# Patient Record
Sex: MALE | Race: WHITE | NOT HISPANIC OR LATINO | Employment: FULL TIME | URBAN - METROPOLITAN AREA
[De-identification: names, ages, dates, MRNs, and addresses within clinical notes are randomized per-mention and may not be internally consistent; named-entity substitution may affect disease eponyms.]

---

## 2017-01-27 ENCOUNTER — OFFICE VISIT (OUTPATIENT)
Dept: MEDICAL GROUP | Facility: PHYSICIAN GROUP | Age: 47
End: 2017-01-27
Payer: COMMERCIAL

## 2017-01-27 VITALS
SYSTOLIC BLOOD PRESSURE: 128 MMHG | TEMPERATURE: 98.1 F | BODY MASS INDEX: 26.82 KG/M2 | OXYGEN SATURATION: 97 % | HEART RATE: 76 BPM | RESPIRATION RATE: 12 BRPM | WEIGHT: 198 LBS | HEIGHT: 72 IN | DIASTOLIC BLOOD PRESSURE: 80 MMHG

## 2017-01-27 DIAGNOSIS — R07.9 RECURRENT CHEST PAIN: ICD-10-CM

## 2017-01-27 DIAGNOSIS — E78.2 MIXED HYPERLIPIDEMIA: ICD-10-CM

## 2017-01-27 DIAGNOSIS — E03.4 HYPOTHYROIDISM DUE TO ACQUIRED ATROPHY OF THYROID: ICD-10-CM

## 2017-01-27 PROCEDURE — 99214 OFFICE O/P EST MOD 30 MIN: CPT | Performed by: NURSE PRACTITIONER

## 2017-01-27 RX ORDER — TIZANIDINE 4 MG/1
4 TABLET ORAL EVERY 6 HOURS PRN
Qty: 30 TAB | Refills: 3 | Status: SHIPPED | OUTPATIENT
Start: 2017-01-27 | End: 2018-08-24

## 2017-01-27 RX ORDER — METHYLPREDNISOLONE 4 MG/1
TABLET ORAL
Qty: 21 TAB | Refills: 0 | Status: SHIPPED | OUTPATIENT
Start: 2017-01-27 | End: 2018-08-24

## 2017-01-27 ASSESSMENT — PATIENT HEALTH QUESTIONNAIRE - PHQ9: CLINICAL INTERPRETATION OF PHQ2 SCORE: 0

## 2017-01-27 NOTE — ASSESSMENT & PLAN NOTE
Patient has history of hyperlipidemia but is not on any statin treatment. Last fasting lipid panel 2013

## 2017-01-27 NOTE — ASSESSMENT & PLAN NOTE
"Long-standing problem for about 5 years, worse over this past week. The pain is located in middle of chest.The pain never wakes him up at night. The pain is described as dull, \"just kind of there.\" Certain movements make it worse. There is no worsening of pain with exertion. There is no associated shortness of breath, dyspnea, diaphoresis, arm or jaw pain, or sense of doom. He has been more tired recently, but also has a toddler now and states he was feeling more tired since diagnosed with low thyroid.  No treatments tried. He has not been smoking regularly, but this week was smoking a little bit with his friends. Had EKG around 2011 that was normal. At this time he was not having chest pains.   Reports occasional indigestion with trigger foods, but not routinely. He is a  so is physically demanding, but no regular exercise. No associated neck or upper back pain. He did break right collar bone one year ago and since then has had resultant right chest and shoulder pains.   "

## 2017-01-27 NOTE — ASSESSMENT & PLAN NOTE
Established problem currently managed with levothyroxine 100 mcg daily. Patient reports taking this on an empty stomach. Denies any myalgias, poor concentration, weight gain, or change in skin. He reports chronic fatigue, but relates this to his toddler   Ref. Range 1/30/2015 16:04 6/10/2016 12:05   TSH Latest Ref Range: 0.300-3.700 uIU/mL 1.320 0.900   Free T-4 Latest Ref Range: 0.53-1.43 ng/dL 0.84 1.04

## 2017-01-30 NOTE — PROGRESS NOTES
"Subjective:     Chief Complaint   Patient presents with   • Chest Pain     this has been happening but is worsening   • Thyroid Problem     needs thyroid levels checked       HPI  Michele Griffin is a 46 y.o. Male, patient of Dr. Navas, here today for complaints of chest pain and to discuss thyroid    Hypothyroidism  Established problem currently managed with levothyroxine 100 mcg daily. Patient reports taking this on an empty stomach. Denies any myalgias, poor concentration, weight gain, or change in skin. He reports chronic fatigue, but relates this to his toddler   Ref. Range 1/30/2015 16:04 6/10/2016 12:05   TSH Latest Ref Range: 0.300-3.700 uIU/mL 1.320 0.900   Free T-4 Latest Ref Range: 0.53-1.43 ng/dL 0.84 1.04       Mixed Hyperlipidemia  Patient has history of hyperlipidemia but is not on any statin treatment. Last fasting lipid panel 2013    Recurrent chest pain  Long-standing problem for about 5 years, worse over this past week. The pain is located in middle of chest.The pain never wakes him up at night. The pain is described as dull, \"just kind of there.\" Certain movements make it worse. There is no worsening of pain with exertion. There is no associated shortness of breath, dyspnea, diaphoresis, arm or jaw pain, or sense of doom. He has been more tired recently, but also has a toddler now and states he was feeling more tired since diagnosed with low thyroid.  No treatments tried. He has not been smoking regularly, but this week was smoking a little bit with his friends. Had EKG around 2011 that was normal. At this time he was not having chest pains.   Reports occasional indigestion with trigger foods, but not routinely. He is a  so is physically demanding, but no regular exercise. No associated neck or upper back pain. He did break right collar bone one year ago and since then has had resultant right chest and shoulder pains.          Diagnoses of Recurrent chest pain, Hypothyroidism " due to acquired atrophy of thyroid, and Mixed hyperlipidemia were pertinent to this visit.    Allergies: Review of patient's allergies indicates no known allergies.  Current medicines (including changes today)  Current Outpatient Prescriptions   Medication Sig Dispense Refill   • tizanidine (ZANAFLEX) 4 MG Tab Take 1 Tab by mouth every 6 hours as needed. 30 Tab 3   • MethylPREDNISolone (MEDROL DOSEPAK) 4 MG Tablet Therapy Pack Follow package instructions 21 Tab 0   • levothyroxine (SYNTHROID) 100 MCG Tab TAKE 1 TABLET BY MOUTH MONDAY THROUGH SATURDAY SKIP SUNDAYS 100 Tab 3   • famciclovir (FAMVIR) 500 MG Tab Take 1 Tab by mouth 2 Times a Day. 14 Tab 3   • buPROPion SR (WELLBUTRIN-SR) 150 MG TABLET SR 12 HR sustained-release tablet TAKE 1 TABLET BY MOUTH TWICE DAILY 180 Tab 1     No current facility-administered medications for this visit.       He  has a past medical history of Inguinal hernia; Herpetic gingivostomatitis; Substance abuse; and Thyroid disease.    Health Maintenance: UTD    ROS  As stated in HPI      Objective:     Blood pressure 128/80, pulse 76, temperature 36.7 °C (98.1 °F), resp. rate 12, height 1.829 m (6'), weight 89.812 kg (198 lb), SpO2 97 %. Body mass index is 26.85 kg/(m^2).  Physical Exam:  General: Alert, oriented, in no acute distress.  Eye contact is good, speech goal directed, affect calm  HEENT: conjunctiva non-injected, sclera non-icteric, EOMs intact.  Neck: No adenopathy or masses in the cervical or supraclavicular regions. No thyromegaly  Lungs: unlabored. clear to auscultation bilaterally with good excursion.  CV: regular rate and rhythm. No murmurs or friction rub. No carotid bruits.   Abdomen: Soft, ND, non-tender. No hepatosplenomegaly.   MSK: No tenderness with palpation of ICS of ribs or sternum. Patient crossed arms over chest and squeezed stating this reproduced pain.       Assessment and Plan:   The following treatment plan was discussed  1. Recurrent chest pain   pain  does appear to be of musculoskeletal origin. I have recommended we do an EKG today as the previous EKG was done prior to him having these symptoms. Patient declines EKG today. He will agree to do EKG at the following blood workup and treatment regimen offers no relief. We will check CMP for electrolyte abnormalities.    check chest x-ray to ensure no bony abnormality causing symptoms such as possible broken rib. Start Medrol Dosepak and tizanidine as it appears that pain is muscular and maybe with decreasing inflammation and relaxing the muscles with light stretching resolve   COMP METABOLIC PANEL    DX-CHEST-2 VIEWS    tizanidine (ZANAFLEX) 4 MG Tab    MethylPREDNISolone (MEDROL DOSEPAK) 4 MG Tablet Therapy Pack   2. Hypothyroidism due to acquired atrophy of thyroid   stable. Check thyroid level now  TSH   3. Mixed hyperlipidemia   follow-up lipid panel due now to determine where patient is in regards to this management  LIPID PROFILE       Followup: Return in about 3 years (around 1/27/2020), or if no resolution of chest pains. sooner should new symptoms or problems arise.

## 2017-02-08 ENCOUNTER — HOSPITAL ENCOUNTER (OUTPATIENT)
Dept: LAB | Facility: MEDICAL CENTER | Age: 47
End: 2017-02-08
Attending: NURSE PRACTITIONER
Payer: COMMERCIAL

## 2017-02-08 ENCOUNTER — HOSPITAL ENCOUNTER (OUTPATIENT)
Dept: RADIOLOGY | Facility: MEDICAL CENTER | Age: 47
End: 2017-02-08
Attending: NURSE PRACTITIONER
Payer: COMMERCIAL

## 2017-02-08 DIAGNOSIS — R07.9 RECURRENT CHEST PAIN: ICD-10-CM

## 2017-02-08 DIAGNOSIS — E78.2 MIXED HYPERLIPIDEMIA: ICD-10-CM

## 2017-02-08 DIAGNOSIS — E03.4 HYPOTHYROIDISM DUE TO ACQUIRED ATROPHY OF THYROID: ICD-10-CM

## 2017-02-08 LAB
ALBUMIN SERPL BCP-MCNC: 4.2 G/DL (ref 3.2–4.9)
ALBUMIN/GLOB SERPL: 1.3 G/DL
ALP SERPL-CCNC: 68 U/L (ref 30–99)
ALT SERPL-CCNC: 15 U/L (ref 2–50)
ANION GAP SERPL CALC-SCNC: 4 MMOL/L (ref 0–11.9)
AST SERPL-CCNC: 14 U/L (ref 12–45)
BILIRUB SERPL-MCNC: 0.3 MG/DL (ref 0.1–1.5)
BUN SERPL-MCNC: 18 MG/DL (ref 8–22)
CALCIUM SERPL-MCNC: 9.3 MG/DL (ref 8.5–10.5)
CHLORIDE SERPL-SCNC: 104 MMOL/L (ref 96–112)
CHOLEST SERPL-MCNC: 231 MG/DL (ref 100–199)
CO2 SERPL-SCNC: 27 MMOL/L (ref 20–33)
CREAT SERPL-MCNC: 0.81 MG/DL (ref 0.5–1.4)
GLOBULIN SER CALC-MCNC: 3.2 G/DL (ref 1.9–3.5)
GLUCOSE SERPL-MCNC: 83 MG/DL (ref 65–99)
HDLC SERPL-MCNC: 42 MG/DL
LDLC SERPL CALC-MCNC: 137 MG/DL
POTASSIUM SERPL-SCNC: 4.2 MMOL/L (ref 3.6–5.5)
PROT SERPL-MCNC: 7.4 G/DL (ref 6–8.2)
SODIUM SERPL-SCNC: 135 MMOL/L (ref 135–145)
TRIGL SERPL-MCNC: 259 MG/DL (ref 0–149)
TSH SERPL DL<=0.005 MIU/L-ACNC: 1.3 UIU/ML (ref 0.3–3.7)

## 2017-02-08 PROCEDURE — 36415 COLL VENOUS BLD VENIPUNCTURE: CPT

## 2017-02-08 PROCEDURE — 84443 ASSAY THYROID STIM HORMONE: CPT

## 2017-02-08 PROCEDURE — 71020 DX-CHEST-2 VIEWS: CPT

## 2017-02-08 PROCEDURE — 80053 COMPREHEN METABOLIC PANEL: CPT

## 2017-02-08 PROCEDURE — 80061 LIPID PANEL: CPT

## 2017-10-16 ENCOUNTER — OFFICE VISIT (OUTPATIENT)
Dept: URGENT CARE | Facility: PHYSICIAN GROUP | Age: 47
End: 2017-10-16
Payer: COMMERCIAL

## 2017-10-16 ENCOUNTER — HOSPITAL ENCOUNTER (OUTPATIENT)
Dept: RADIOLOGY | Facility: MEDICAL CENTER | Age: 47
End: 2017-10-16
Attending: FAMILY MEDICINE
Payer: COMMERCIAL

## 2017-10-16 VITALS
DIASTOLIC BLOOD PRESSURE: 86 MMHG | OXYGEN SATURATION: 98 % | WEIGHT: 184 LBS | HEART RATE: 78 BPM | SYSTOLIC BLOOD PRESSURE: 140 MMHG | BODY MASS INDEX: 24.92 KG/M2 | RESPIRATION RATE: 14 BRPM | HEIGHT: 72 IN | TEMPERATURE: 98.2 F

## 2017-10-16 DIAGNOSIS — R07.89 CHEST PRESSURE: ICD-10-CM

## 2017-10-16 PROCEDURE — 71020 DX-CHEST-2 VIEWS: CPT

## 2017-10-16 PROCEDURE — 99214 OFFICE O/P EST MOD 30 MIN: CPT | Performed by: FAMILY MEDICINE

## 2017-10-16 ASSESSMENT — PAIN SCALES - GENERAL: PAINLEVEL: NO PAIN

## 2017-10-31 ASSESSMENT — ENCOUNTER SYMPTOMS
WEAKNESS: 0
VOMITING: 0
NAUSEA: 0
EXERTIONAL CHEST PRESSURE: 0

## 2017-10-31 NOTE — PROGRESS NOTES
Subjective:   Michele Baldwin a 47 y.o. male who presents for Chest Pressure (x1day, chest pressure, SOB)        Chest Pain    This is a new problem. The current episode started yesterday. The onset quality is gradual. The problem occurs constantly. The problem has been waxing and waning. The pain is moderate. The quality of the pain is described as dull. Pertinent negatives include no exertional chest pressure, nausea, vomiting or weakness.     Review of Systems   Cardiovascular: Positive for chest pain.   Gastrointestinal: Negative for nausea and vomiting.   Neurological: Negative for weakness.     No Known Allergies   Objective:   /86   Pulse 78   Temp 36.8 °C (98.2 °F)   Resp 14   Ht 1.829 m (6')   Wt 83.5 kg (184 lb)   SpO2 98%   BMI 24.95 kg/m²   Physical Exam   Constitutional: He is oriented to person, place, and time. He appears well-developed and well-nourished. No distress.   HENT:   Head: Normocephalic and atraumatic.   Eyes: Conjunctivae and EOM are normal. Pupils are equal, round, and reactive to light.   Cardiovascular: Normal rate and regular rhythm.    No murmur heard.  Pulmonary/Chest: Effort normal and breath sounds normal. No respiratory distress. He exhibits tenderness.   Abdominal: Soft. He exhibits no distension. There is no tenderness.   Neurological: He is alert and oriented to person, place, and time. He has normal reflexes. No sensory deficit.   Skin: Skin is warm and dry.   Psychiatric: He has a normal mood and affect.         Assessment/Plan:   Assessment    1. Chest pressure  No acute CP Process on my read. Use over-the-counter pain reliever, such as acetaminophen (Tylenol), ibuprofen (Advil, Motrin) or naproxen (Aleve) as needed; follow package directions for dosing. Differential diagnosis, natural history, supportive care, and indications for immediate follow-up discussed.   - DX-CHEST-2 VIEWS; Future

## 2017-11-06 ENCOUNTER — APPOINTMENT (OUTPATIENT)
Dept: RADIOLOGY | Facility: MEDICAL CENTER | Age: 47
DRG: 552 | End: 2017-11-06
Attending: EMERGENCY MEDICINE
Payer: COMMERCIAL

## 2017-11-06 ENCOUNTER — HOSPITAL ENCOUNTER (INPATIENT)
Facility: MEDICAL CENTER | Age: 47
LOS: 1 days | DRG: 552 | End: 2017-11-07
Attending: EMERGENCY MEDICINE | Admitting: NEUROLOGICAL SURGERY
Payer: COMMERCIAL

## 2017-11-06 DIAGNOSIS — S09.90XA CLOSED HEAD INJURY, INITIAL ENCOUNTER: ICD-10-CM

## 2017-11-06 DIAGNOSIS — S22.060A CLOSED WEDGE COMPRESSION FRACTURE OF SEVENTH THORACIC VERTEBRA, INITIAL ENCOUNTER: ICD-10-CM

## 2017-11-06 DIAGNOSIS — S92.901A CLOSED FRACTURE OF RIGHT FOOT, INITIAL ENCOUNTER: ICD-10-CM
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
## 2017-11-06 DIAGNOSIS — S16.1XXA STRAIN OF NECK MUSCLE, INITIAL ENCOUNTER: ICD-10-CM

## 2017-11-06 DIAGNOSIS — S20.211A CONTUSION OF RIGHT CHEST WALL, INITIAL ENCOUNTER: ICD-10-CM

## 2017-11-06 PROBLEM — S22.009A THORACIC SPINE FRACTURE (HCC): Status: ACTIVE | Noted: 2017-11-06

## 2017-11-06 LAB
ABO GROUP BLD: NORMAL
ALBUMIN SERPL BCP-MCNC: 4.3 G/DL (ref 3.2–4.9)
ALBUMIN/GLOB SERPL: 1.3 G/DL
ALP SERPL-CCNC: 66 U/L (ref 30–99)
ALT SERPL-CCNC: 18 U/L (ref 2–50)
ANION GAP SERPL CALC-SCNC: 8 MMOL/L (ref 0–11.9)
APTT PPP: 22.1 SEC (ref 24.7–36)
AST SERPL-CCNC: 23 U/L (ref 12–45)
BILIRUB SERPL-MCNC: 0.5 MG/DL (ref 0.1–1.5)
BLD GP AB SCN SERPL QL: NORMAL
BUN SERPL-MCNC: 19 MG/DL (ref 8–22)
CALCIUM SERPL-MCNC: 9.2 MG/DL (ref 8.5–10.5)
CHLORIDE SERPL-SCNC: 106 MMOL/L (ref 96–112)
CO2 SERPL-SCNC: 24 MMOL/L (ref 20–33)
CREAT SERPL-MCNC: 0.84 MG/DL (ref 0.5–1.4)
ERYTHROCYTE [DISTWIDTH] IN BLOOD BY AUTOMATED COUNT: 39.6 FL (ref 35.9–50)
ETHANOL BLD-MCNC: 0 G/DL
GFR SERPL CREATININE-BSD FRML MDRD: >60 ML/MIN/1.73 M 2
GLOBULIN SER CALC-MCNC: 3.2 G/DL (ref 1.9–3.5)
GLUCOSE SERPL-MCNC: 117 MG/DL (ref 65–99)
HCT VFR BLD AUTO: 49.4 % (ref 42–52)
HGB BLD-MCNC: 16.9 G/DL (ref 14–18)
INR PPP: 1.03 (ref 0.87–1.13)
MCH RBC QN AUTO: 31 PG (ref 27–33)
MCHC RBC AUTO-ENTMCNC: 34.2 G/DL (ref 33.7–35.3)
MCV RBC AUTO: 90.5 FL (ref 81.4–97.8)
PLATELET # BLD AUTO: 200 K/UL (ref 164–446)
PMV BLD AUTO: 9.3 FL (ref 9–12.9)
POTASSIUM SERPL-SCNC: 4.1 MMOL/L (ref 3.6–5.5)
PROT SERPL-MCNC: 7.5 G/DL (ref 6–8.2)
PROTHROMBIN TIME: 13.2 SEC (ref 12–14.6)
RBC # BLD AUTO: 5.46 M/UL (ref 4.7–6.1)
RH BLD: NORMAL
SODIUM SERPL-SCNC: 138 MMOL/L (ref 135–145)
WBC # BLD AUTO: 6.4 K/UL (ref 4.8–10.8)

## 2017-11-06 PROCEDURE — 72131 CT LUMBAR SPINE W/O DYE: CPT

## 2017-11-06 PROCEDURE — 700111 HCHG RX REV CODE 636 W/ 250 OVERRIDE (IP): Performed by: EMERGENCY MEDICINE

## 2017-11-06 PROCEDURE — L0150 CERV SEMI-RIG ADJ MOLDED CHN: HCPCS

## 2017-11-06 PROCEDURE — 96374 THER/PROPH/DIAG INJ IV PUSH: CPT

## 2017-11-06 PROCEDURE — 700102 HCHG RX REV CODE 250 W/ 637 OVERRIDE(OP): Performed by: NEUROLOGICAL SURGERY

## 2017-11-06 PROCEDURE — 700102 HCHG RX REV CODE 250 W/ 637 OVERRIDE(OP): Performed by: EMERGENCY MEDICINE

## 2017-11-06 PROCEDURE — 85027 COMPLETE CBC AUTOMATED: CPT

## 2017-11-06 PROCEDURE — 85610 PROTHROMBIN TIME: CPT

## 2017-11-06 PROCEDURE — 96376 TX/PRO/DX INJ SAME DRUG ADON: CPT

## 2017-11-06 PROCEDURE — 86900 BLOOD TYPING SEROLOGIC ABO: CPT

## 2017-11-06 PROCEDURE — 71010 DX-CHEST-LIMITED (1 VIEW): CPT

## 2017-11-06 PROCEDURE — A9270 NON-COVERED ITEM OR SERVICE: HCPCS | Performed by: NEUROLOGICAL SURGERY

## 2017-11-06 PROCEDURE — 71260 CT THORAX DX C+: CPT

## 2017-11-06 PROCEDURE — 73630 X-RAY EXAM OF FOOT: CPT | Mod: RT

## 2017-11-06 PROCEDURE — 85730 THROMBOPLASTIN TIME PARTIAL: CPT

## 2017-11-06 PROCEDURE — 72125 CT NECK SPINE W/O DYE: CPT

## 2017-11-06 PROCEDURE — L0458 TLSO 2MOD SYMPHIS-XIPHO PRE: HCPCS

## 2017-11-06 PROCEDURE — 770001 HCHG ROOM/CARE - MED/SURG/GYN PRIV*

## 2017-11-06 PROCEDURE — 99285 EMERGENCY DEPT VISIT HI MDM: CPT

## 2017-11-06 PROCEDURE — 96375 TX/PRO/DX INJ NEW DRUG ADDON: CPT

## 2017-11-06 PROCEDURE — 80307 DRUG TEST PRSMV CHEM ANLYZR: CPT

## 2017-11-06 PROCEDURE — A9270 NON-COVERED ITEM OR SERVICE: HCPCS | Performed by: EMERGENCY MEDICINE

## 2017-11-06 PROCEDURE — 36415 COLL VENOUS BLD VENIPUNCTURE: CPT

## 2017-11-06 PROCEDURE — 305948 HCHG GREEN TRAUMA ACT PRE-NOTIFY NO CC

## 2017-11-06 PROCEDURE — 70450 CT HEAD/BRAIN W/O DYE: CPT

## 2017-11-06 PROCEDURE — 72128 CT CHEST SPINE W/O DYE: CPT

## 2017-11-06 PROCEDURE — 80053 COMPREHEN METABOLIC PANEL: CPT

## 2017-11-06 PROCEDURE — 86850 RBC ANTIBODY SCREEN: CPT

## 2017-11-06 PROCEDURE — 700117 HCHG RX CONTRAST REV CODE 255: Performed by: EMERGENCY MEDICINE

## 2017-11-06 PROCEDURE — 86901 BLOOD TYPING SEROLOGIC RH(D): CPT

## 2017-11-06 RX ORDER — MORPHINE SULFATE 4 MG/ML
INJECTION, SOLUTION INTRAMUSCULAR; INTRAVENOUS
Status: COMPLETED | OUTPATIENT
Start: 2017-11-06 | End: 2017-11-06

## 2017-11-06 RX ORDER — MORPHINE SULFATE 4 MG/ML
4 INJECTION, SOLUTION INTRAMUSCULAR; INTRAVENOUS ONCE
Status: COMPLETED | OUTPATIENT
Start: 2017-11-06 | End: 2017-11-06

## 2017-11-06 RX ORDER — OXYCODONE HYDROCHLORIDE AND ACETAMINOPHEN 5; 325 MG/1; MG/1
1 TABLET ORAL ONCE
Status: COMPLETED | OUTPATIENT
Start: 2017-11-06 | End: 2017-11-06

## 2017-11-06 RX ORDER — METHOCARBAMOL 750 MG/1
750 TABLET, FILM COATED ORAL 4 TIMES DAILY
Status: DISCONTINUED | OUTPATIENT
Start: 2017-11-06 | End: 2017-11-06

## 2017-11-06 RX ORDER — HYDROCODONE BITARTRATE AND ACETAMINOPHEN 5; 325 MG/1; MG/1
1-2 TABLET ORAL EVERY 6 HOURS PRN
Status: DISCONTINUED | OUTPATIENT
Start: 2017-11-06 | End: 2017-11-06

## 2017-11-06 RX ORDER — METHOCARBAMOL 750 MG/1
750 TABLET, FILM COATED ORAL 4 TIMES DAILY
Status: DISCONTINUED | OUTPATIENT
Start: 2017-11-06 | End: 2017-11-07 | Stop reason: HOSPADM

## 2017-11-06 RX ORDER — KETOROLAC TROMETHAMINE 30 MG/ML
30 INJECTION, SOLUTION INTRAMUSCULAR; INTRAVENOUS EVERY 6 HOURS
Status: DISCONTINUED | OUTPATIENT
Start: 2017-11-06 | End: 2017-11-06

## 2017-11-06 RX ORDER — GABAPENTIN 300 MG/1
300 CAPSULE ORAL 3 TIMES DAILY
Status: DISCONTINUED | OUTPATIENT
Start: 2017-11-06 | End: 2017-11-06

## 2017-11-06 RX ORDER — GABAPENTIN 300 MG/1
300 CAPSULE ORAL 3 TIMES DAILY
Status: DISCONTINUED | OUTPATIENT
Start: 2017-11-06 | End: 2017-11-07 | Stop reason: HOSPADM

## 2017-11-06 RX ORDER — HYDROCODONE BITARTRATE AND ACETAMINOPHEN 5; 325 MG/1; MG/1
1-2 TABLET ORAL EVERY 6 HOURS PRN
Status: DISCONTINUED | OUTPATIENT
Start: 2017-11-06 | End: 2017-11-07 | Stop reason: HOSPADM

## 2017-11-06 RX ORDER — ONDANSETRON 2 MG/ML
4 INJECTION INTRAMUSCULAR; INTRAVENOUS ONCE
Status: COMPLETED | OUTPATIENT
Start: 2017-11-06 | End: 2017-11-06

## 2017-11-06 RX ORDER — KETOROLAC TROMETHAMINE 30 MG/ML
30 INJECTION, SOLUTION INTRAMUSCULAR; INTRAVENOUS EVERY 6 HOURS
Status: DISCONTINUED | OUTPATIENT
Start: 2017-11-07 | End: 2017-11-07 | Stop reason: HOSPADM

## 2017-11-06 RX ORDER — LEVOTHYROXINE SODIUM 0.1 MG/1
100 TABLET ORAL
COMMUNITY
End: 2018-08-24

## 2017-11-06 RX ADMIN — METHOCARBAMOL 750 MG: 750 TABLET ORAL at 20:46

## 2017-11-06 RX ADMIN — GABAPENTIN 300 MG: 300 CAPSULE ORAL at 20:46

## 2017-11-06 RX ADMIN — OXYCODONE HYDROCHLORIDE AND ACETAMINOPHEN 1 TABLET: 5; 325 TABLET ORAL at 16:00

## 2017-11-06 RX ADMIN — HYDROCODONE BITARTRATE AND ACETAMINOPHEN 2 TABLET: 5; 325 TABLET ORAL at 20:48

## 2017-11-06 RX ADMIN — MORPHINE SULFATE 2 MG: 4 INJECTION INTRAVENOUS at 11:46

## 2017-11-06 RX ADMIN — MORPHINE SULFATE 4 MG: 4 INJECTION INTRAVENOUS at 13:13

## 2017-11-06 RX ADMIN — ONDANSETRON 4 MG: 2 INJECTION INTRAMUSCULAR; INTRAVENOUS at 16:00

## 2017-11-06 RX ADMIN — IOHEXOL 100 ML: 350 INJECTION, SOLUTION INTRAVENOUS at 12:05

## 2017-11-06 ASSESSMENT — LIFESTYLE VARIABLES
AVERAGE NUMBER OF DAYS PER WEEK YOU HAVE A DRINK CONTAINING ALCOHOL: 7
DO YOU DRINK ALCOHOL: YES
CONSUMPTION TOTAL: POSITIVE
TOTAL SCORE: 0
TOTAL SCORE: 0
EVER FELT BAD OR GUILTY ABOUT YOUR DRINKING: NO
HAVE YOU EVER FELT YOU SHOULD CUT DOWN ON YOUR DRINKING: NO
HOW MANY TIMES IN THE PAST YEAR HAVE YOU HAD 5 OR MORE DRINKS IN A DAY: 1
EVER HAD A DRINK FIRST THING IN THE MORNING TO STEADY YOUR NERVES TO GET RID OF A HANGOVER: NO
HAVE PEOPLE ANNOYED YOU BY CRITICIZING YOUR DRINKING: NO
ON A TYPICAL DAY WHEN YOU DRINK ALCOHOL HOW MANY DRINKS DO YOU HAVE: 1
TOTAL SCORE: 0

## 2017-11-06 ASSESSMENT — PAIN SCALES - GENERAL
PAINLEVEL_OUTOF10: 3
PAINLEVEL_OUTOF10: 5
PAINLEVEL_OUTOF10: 5
PAINLEVEL_OUTOF10: 8
PAINLEVEL_OUTOF10: 6
PAINLEVEL_OUTOF10: 4
PAINLEVEL_OUTOF10: 6

## 2017-11-06 ASSESSMENT — ENCOUNTER SYMPTOMS
BACK PAIN: 1
NECK PAIN: 0

## 2017-11-06 NOTE — LETTER
FORM C-4:  EMPLOYEE’S CLAIM FOR COMPENSATION/ REPORT OF INITIAL TREATMENT  EMPLOYEE’S CLAIM - PROVIDE ALL INFORMATION REQUESTED   First Name  Dandy Last Name  One Birthdate             Age  1970 47 y.o. Sex  male Claim Number   Home Employee Address  [address] [address]  [facility]                                     Zip  [address] Height  1.829 m (6') Weight  83 kg (183 lb) [facility]  [id number]   Mailing Employee Address                           [address] [address]   [facility]               Zip  [address] Telephone  [phone] (home)  Primary Language Spoken  ENGLISH   Insurer  *** Third Party   [employer] [place] Employee's Occupation (Job Title) When Injury or Occupational Disease Occurred     Employer's Name   Telephone  [phone]    Employer Address  [address] [address] [facility] [address]] Zip  [address]   Date of Injury  11/6/2017       Hour of Injury  10:45 AM Date Employer Notified  11/6/2017 Last Day of Work after Injury or Occupational Disease  11/6/2017 Supervisor to Whom Injury Reported  Self - Michele Griffin   Address or Location of Accident (if applicable)  [[address] [place] [address]]   What were you doing at the time of accident? (if applicable)  working from ladder    How did this injury or occupational disease occur? Be specific and answer in detail. Use additional sheet if necessary)  Ladder slid as went to step from ladder to roof. fell - 10 feet onto right side and head into dirt. unconcious for 20 seconds   If you believe that you have an occupational disease, when did you first have knowledge of the disability and it relationship to your employment?  N/A Witnesses to the Accident  Bhaskar Cowan     Nature of Injury or Occupational Disease  Workers' Compensation  Part(s) of Body Injured or Affected  Lower Back Area (Lumbar Area & Lumbo-Sacral), Soft Tissue - Neck, Toe (R)    I certify that the above is true and correct to the best of my knowledge and that I  have provided this information in order to obtain the benefits of [place]  I hereby authorize any physician, chiropractor, surgeon, practitioner, or other person, any hospital, including [facility] or [facility], any medical service organization, any insurance company, or other institution or organization to release to each other, any medical or other information, including benefits paid or payable, pertinent to this injury or disease, except information relative to diagnosis, treatment and/or counseling for AIDS, psychological conditions, alcohol or controlled substances, for which I must give specific authorization.  A Photostat of this authorization shall be as valid as the original.   Date Place   Employee’s Signature   THIS REPORT MUST BE COMPLETED AND MAILED WITHIN 3 WORKING DAYS OF TREATMENT   Place  [facility], EMERGENCY DEPT  Name of Facility   [facility]   Date  10/26/2017 Diagnosis  No diagnosis found. Is there evidence the injured employee was under the influence of alcohol and/or another controlled substance at the time of accident?   Hour  1:39 PM Description of Injury or Disease       Treatment     Have you advised the patient to remain off work five days or more?             X-Ray Findings      If Yes   From Date    To Date      From information given by the employee, together with medical evidence, can you directly connect this injury or occupational disease as job incurred?    If No, is the employee capable of: Full Duty    Modified Duty      Is additional medical care by a physician indicated?    If Modified Duty, Specify any Limitations / Restrictions        Do you know of any previous injury or disease contributing to this condition or occupational disease?      Date  11/6/2017 Print Doctor’s Name  David Kenyon certify the  "employer’s copy of this form was mailed on:   Address  [address]  [place] [address] [phone] Insurer’s Use Only   [facility]  [address]    Provider’s Tax ID Number  [id number] Telephone  Dept: [phone]    Doctor’s Signature    Degree       Original - TREATING PHYSICIAN OR CHIROPRACTOR   Pg 2-Insurer/TPA   Pg 3-Employer   Pg 4-Employee                                                                                                  Form C-4 (rev01/03)     BRIEF DESCRIPTION OF RIGHTS AND BENEFITS  (Pursuant to [place] 616[place])    Notice of Injury or Occupational Disease (Incident Report Form C-1): If an injury or occupational disease (OD) arises out of and in the course of employment, you must provide written notice to your employer as soon as practicable, but no later than 7 days after the accident or OD. Your employer shall maintain a sufficient supply of the required forms.    Claim for Compensation (Form C-4): If medical treatment is sought, the form C-4 is available at the place of initial treatment. A completed \"Claim for Compensation\" (Form C-4) must be filed within 90 days after an accident or OD. The treating physician or chiropractor must, within 3 working days after treatment, complete and mail to the employer, the employer's insurer and third-party , the Claim for Compensation.    Medical Treatment: If you require medical treatment for your on-the-job injury or OD, you may be required to select a physician or chiropractor from a list provided by your workers’ compensation insurer, if it has contracted with an Organization for Managed Care (MCO) or Preferred Provider Organization (PPO) or providers of health care. If your employer has not entered into a contract with an MCO or PPO, you may select a physician or chiropractor from the Panel of Physicians and Chiropractors. Any medical costs related to your industrial injury or OD will be paid by your " insurer.    Temporary Total Disability (TTD): If your doctor has certified that you are unable to work for a period of at least 5 consecutive days, or 5 cumulative days in a 20-day period, or places restrictions on you that your employer does not accommodate, you may be entitled to TTD compensation.    Temporary Partial Disability (TPD): If the wage you receive upon reemployment is less than the compensation for TTD to which you are entitled, the insurer may be required to pay you TPD compensation to make up the difference. TPD can only be paid for a maximum of 24 months.    Permanent Partial Disability (PPD): When your medical condition is stable and there is an indication of a PPD as a result of your injury or OD, within 30 days, your insurer must arrange for an evaluation by a rating physician or chiropractor to determine the degree of your PPD. The amount of your PPD award depends on the date of injury, the results of the PPD evaluation and your age and wage.    Permanent Total Disability (PTD): If you are medically certified by a treating physician or chiropractor as permanently and totally disabled and have been granted a PTD status by your insurer, you are entitled to receive monthly benefits not to exceed 66 2/3% of your average monthly wage. The amount of your PTD payments is subject to reduction if you previously received a PPD award.    Vocational Rehabilitation Services: You may be eligible for vocational rehabilitation services if you are unable to return to the job due to a permanent physical impairment or permanent restrictions as a result of your injury or occupational disease.    Transportation and Per Alin Reimbursement: You may be eligible for travel expenses and per alin associated with medical treatment.  Reopening: You may be able to reopen your claim if your condition worsens after claim closure.    Appeal Process: If you disagree with a written determination issued by the insurer or the  insurer does not respond to your request, you may appeal to the Department of Administration, , by following the instructions contained in your determination letter. You must appeal the determination within 70 days from the date of the determination letter at [address], [address], [place], [place] [address], or [address], [address], [place], [place] [address]. If you disagree with the  decision, you may appeal to the Department of Administration, . You must file your appeal within 30 days from the date of the  decision letter at [address], [address], [place], [place] [address], or [address], [address], [place], [place] [address]. If you disagree with a decision of an , you may file a petition for judicial review with the District Court. You must do so within 30 days of the Appeal Officer’s decision. You may be represented by an  at your own expense or you may contact the [facility] for possible representation.    [place]  for Injured Workers (NAIW): If you disagree with a  decision, you may request that NAIW represent you without charge at an  Hearing. For information regarding denial of benefits, you may contact the [facility] at: [address], [address], [place] [address], [phone], or [address], [place] [address], [phone]    To File a Complaint with the Division: If you wish to file a complaint with the  of the Division of Industrial Relations (DIR), please contact the Workers’ Compensation Section, [address], [address], [place], [place] [address], telephone [phone]) [phone], or [address], [place] [address], telephone [phone].    For assistance with Workers’ Compensation Issues: you may contact the Office of the [facility] Consumer Health Assistance, [address], [address], [place], [place] [address], Toll Free 1-518.195.1372, Web site: [email or web address]., E-mail [email or web address]                                                                                                                                                                               __________________________________________________________________                                    _________________            Employee Name / Signature                                                                                                                            Date                                       D-2 (rev. 10/07)

## 2017-11-06 NOTE — LETTER
FORM C-4:  EMPLOYEE’S CLAIM FOR COMPENSATION/ REPORT OF INITIAL TREATMENT  EMPLOYEE’S CLAIM - PROVIDE ALL INFORMATION REQUESTED   First Name  MICHELE Last Name  DYAN Birthdate             Age  1970 47 y.o. Sex  male Claim Number   Home Employee Address  [address] [address]  [facility]                                     Zip  [address] Height  1.829 m (6') Weight  83 kg (183 lb) [facility]     Mailing Employee Address                           [address] [address]   [facility]               Zip  [address] Telephone  [phone] (home)  Primary Language Spoken  ENGLISH   Insurer  Unable to obtain Third Party   [employer]  [place] Employee's Occupation (Job Title) When Injury or Occupational Disease Occurred     Employer's Name  [employer] Telephone  [phone]    Employer Address  [address] [address] [place] [address]] Zip  [address]   Date of Injury  11/6/2017       Hour of Injury  10:45 AM Date Employer Notified  11/6/2017 Last Day of Work after Injury or Occupational Disease  11/6/2017 Supervisor to Whom Injury Reported  Self - Michele Griffin   Address or Location of Accident (if applicable)  [[address] [facility] [place] [address]]   What were you doing at the time of accident? (if applicable)  working from ladder    How did this injury or occupational disease occur? Be specific and answer in detail. Use additional sheet if necessary)  Ladder slid as went to step from ladder to roof. fell - 10 feet onto right side and head into dirt. unconcious for 20 seconds   If you believe that you have an occupational disease, when did you first have knowledge of the disability and it relationship to your employment?  N/A Witnesses to the Accident  Bhaskar Cowan     Nature of Injury or Occupational Disease  Workers' Compensation  Part(s) of Body Injured or Affected  Lower Back Area (Lumbar Area & Lumbo-Sacral), Soft Tissue - Neck, Toe (R)    I certify that the above is true and  correct to the best of my knowledge and that I have provided this information in order to obtain the benefits of [place]  I hereby authorize any physician, chiropractor, surgeon, practitioner, or other person, any hospital, including [facility] or [facility], any medical service organization, any insurance company, or other institution or organization to release to each other, any medical or other information, including benefits paid or payable, pertinent to this injury or disease, except information relative to diagnosis, treatment and/or counseling for AIDS, psychological conditions, alcohol or controlled substances, for which I must give specific authorization.  A Photostat of this authorization shall be as valid as the original.   Date Place  [facility] Employee’s Signature   THIS REPORT MUST BE COMPLETED AND MAILED WITHIN 3 WORKING DAYS OF TREATMENT   Place  [place] [facility] [place]  Name of Facility   [facility]   Date  10/26/2017 Diagnosis  (S16.1XXA) Strain of neck muscle, initial encounter  (S22.060A) Closed wedge compression fracture of seventh thoracic vertebra, initial encounter (CMS-HCC)  (W19.XXXA) Fall, initial encounter  (S92.901A) Closed fracture of right foot, initial encounter  (S20.211A) Contusion of right chest wall, initial encounter  (S09.90XA) Closed head injury, initial encounter Is there evidence the injured employee was under the influence of alcohol and/or another controlled substance at the time of accident?   Hour  5:36 PM Description of Injury or Disease  Strain of neck muscle, initial encounter  Closed wedge compression fracture of seventh thoracic vertebra, initial encounter (CMS-HCC)  Fall, initial encounter  Closed fracture of right foot, initial encounter  Contusion of right chest wall, initial encounter  Closed head injury, initial  "encounter No   Treatment  CT of the head, neck, chest, abdomen and pelvis to rule out injury and evaluate for injuries.  Have you advised the patient to remain off work five days or more?         Yes   X-Ray Findings  Positive   If Yes   From Date    To Date      From information given by the employee, together with medical evidence, can you directly connect this injury or occupational disease as job incurred?  Yes If No, is the employee capable of: Full Duty  No Modified Duty  No   Is additional medical care by a physician indicated?  Yes If Modified Duty, Specify any Limitations / Restrictions  No work till cleared by occupational health     Do you know of any previous injury or disease contributing to this condition or occupational disease?  No   Date  11/7/2017 Print Doctor’s Name  David Kenyon certify the employer’s copy of this form was mailed on:   Address  [address] [place] [id number] [phone] Insurer’s Use Only   [facility]  [address]    Provider’s Tax ID Number  [id number] Telephone  Dept: [phone]    Doctor’s Signature  e-DAVID Turner M.D. Degree   M.D.    Original - TREATING PHYSICIAN OR CHIROPRACTOR   Pg 2-Insurer/TPA   Pg 3-Employer   Pg 4-Employee                                                                                                  Form C-4 (rev01/03)     BRIEF DESCRIPTION OF RIGHTS AND BENEFITS  (Pursuant to [place] 616C.[place])    Notice of Injury or Occupational Disease (Incident Report Form C-1): If an injury or occupational disease (OD) arises out of and in the course of employment, you must provide written notice to your employer as soon as practicable, but no later than 7 days after the accident or OD. Your employer shall maintain a sufficient supply of the required forms.    Claim for Compensation (Form C-4): If medical treatment is sought, the form C-4 is available at the place of initial treatment. A completed \"Claim for Compensation\" " (Form C-4) must be filed within 90 days after an accident or OD. The treating physician or chiropractor must, within 3 working days after treatment, complete and mail to the employer, the employer's insurer and third-party , the Claim for Compensation.    Medical Treatment: If you require medical treatment for your on-the-job injury or OD, you may be required to select a physician or chiropractor from a list provided by your workers’ compensation insurer, if it has contracted with an Organization for Managed Care (MCO) or Preferred Provider Organization (PPO) or providers of health care. If your employer has not entered into a contract with an MCO or PPO, you may select a physician or chiropractor from the Panel of Physicians and Chiropractors. Any medical costs related to your industrial injury or OD will be paid by your insurer.    Temporary Total Disability (TTD): If your doctor has certified that you are unable to work for a period of at least 5 consecutive days, or 5 cumulative days in a 20-day period, or places restrictions on you that your employer does not accommodate, you may be entitled to TTD compensation.    Temporary Partial Disability (TPD): If the wage you receive upon reemployment is less than the compensation for TTD to which you are entitled, the insurer may be required to pay you TPD compensation to make up the difference. TPD can only be paid for a maximum of 24 months.    Permanent Partial Disability (PPD): When your medical condition is stable and there is an indication of a PPD as a result of your injury or OD, within 30 days, your insurer must arrange for an evaluation by a rating physician or chiropractor to determine the degree of your PPD. The amount of your PPD award depends on the date of injury, the results of the PPD evaluation and your age and wage.    Permanent Total Disability (PTD): If you are medically certified by a treating physician or chiropractor as permanently  and totally disabled and have been granted a PTD status by your insurer, you are entitled to receive monthly benefits not to exceed 66 2/3% of your average monthly wage. The amount of your PTD payments is subject to reduction if you previously received a PPD award.    Vocational Rehabilitation Services: You may be eligible for vocational rehabilitation services if you are unable to return to the job due to a permanent physical impairment or permanent restrictions as a result of your injury or occupational disease.    Transportation and Per Alin Reimbursement: You may be eligible for travel expenses and per alin associated with medical treatment.  Reopening: You may be able to reopen your claim if your condition worsens after claim closure.    Appeal Process: If you disagree with a written determination issued by the insurer or the insurer does not respond to your request, you may appeal to the Department of Administration, , by following the instructions contained in your determination letter. You must appeal the determination within 70 days from the date of the determination letter at [address], [address], [place] [address], or [address], [address], [place] [address]. If you disagree with the  decision, you may appeal to the Department of Administration, . You must file your appeal within 30 days from the date of the  decision letter at [address], [address], [place], [place] [address], or [address], [address], [place], [place] [address]. If you disagree with a decision of an , you may file a petition for judicial review with the District Court. You must do so within 30 days of the Appeal Officer’s decision. You may be represented by an  at your own expense or you may contact the [facility] for possible representation.    [place]  for Injured Workers (IW): If you disagree  with a  decision, you may request that [facility] represent you without charge at an  Hearing. For information regarding denial of benefits, you may contact the [facility] at: [address], [address], [place] [address], [phone], or [address], [address], [place] [address], [phone]    To File a Complaint with the Division: If you wish to file a complaint with the  of the Division of Industrial Relations (DIR), please contact the Workers’ Compensation Section, [address], [address], [place], [place] [address], telephone [phone]) [phone], or [address], [address], [place], [place] [address], telephone [phone].    For assistance with Workers’ Compensation Issues: you may contact the Office of the Governor Consumer Health Assistance, [address] [place] [address], [address], [place], [place] [address], Toll Free 1-399.699.4468, Web site: http://govcha.[facility].nv., E-mail [email or web address].[email or web address].                                                                                                                                                                               __________________________________________________________________                                    _________________            Employee Name / Signature                                                                                                                            Date                                       D-2 (rev. 10/07)

## 2017-11-06 NOTE — PROGRESS NOTES
Deroyal off the shelf TLSO back support brace has been applied and fitted to pt size XL has been used.

## 2017-11-06 NOTE — ED PROVIDER NOTES
ED Provider Note    Scribed for No att. providers found by Ciara Benavides. 11/6/2017, 11:41 AM.    Primary care provider: None.  Means of arrival: EMS  History obtained from: Patient and EMS  History limited by: None    CHIEF COMPLAINT  Chief Complaint   Patient presents with   • Fall Greater than 10 feet     loss of balance fell on ground     HPI Dandy One is a 47 y.o. male who presents to the Emergency Department as a trauma green. EMS reports patient was working on a roof, lost balance, and fell approximately 12 feet landing on his right side. EMS states loss of consciousness and obvious deformity of left ribs. EMS treated patient with 100 mcg of Fentanyl en route to hospital.   Patient reports mid back pain and right big toe pain. Patient states he landed on his tools during the fall. Denies neck pain, abdominal pain, numbness or tingling. The patient has no history of medical problems or allergies to medications. No numbness or tingling or weakness to his arms, legs, even transiently.  Denies any other acute concerns or complaints.    REVIEW OF SYSTEMS  Review of Systems   Musculoskeletal: Positive for back pain and joint pain (right ribs and right big toe). Negative for neck pain.   All other systems reviewed and are negative.    PAST MEDICAL HISTORY   patient denies pertinent medical history    SURGICAL HISTORY  patient denies any surgical history    SOCIAL HISTORY  Patient does not report drug, alcohol, or cigarette use.     FAMILY HISTORY  Patient denies pertinent family history    CURRENT MEDICATIONS  No current facility-administered medications on file prior to encounter.      No current outpatient prescriptions on file prior to encounter.     ALLERGIES  No Known Allergies    PHYSICAL EXAM  VITAL SIGNS: /106   Pulse 65   Temp 36.8 °C (98.2 °F)   Resp 18   Ht 1.829 m (6')   Wt 83 kg (183 lb)   BMI 24.82 kg/m²   Vitals reviewed.  Constitutional: Regular on spine board in full spinal  precautions.  HENT: Normocephalic, Atraumatic, Bilateral external ears normal, Oropharynx moist, No oral exudates, Nose normal.    Eyes: PERRL, EOMI, Conjunctiva normal, No discharge.   Neck: Midline tenderness, not initially ranged in a collar.  Cardiovascular: Normal heart rate, Normal rhythm, No murmurs, No rubs, No gallops.   Thorax & Lungs: Normal breath sounds, No respiratory distress, No wheezing, center chest wall abrasion and tenderness.   Abdomen: Bowel sounds normal, Soft, right upper quadrant tenderness .  Patient the right upper quadrant.  Skin: Warm, Dry, No erythema, No rash.   Back: Midthoracic tenderness.  No lumbar tenderness or step-offs.    Musculoskeletal: Good range of motion in all major joints. No edema. No tenderness to palpation or major deformities noted.  The right great toe is very tender, but is normal sensation.  No significant swelling.  No other foot tenderness.  Good pulses.  Good range of motion of hips, knees and ankles.  Neurologic: Alert, Normal motor function, Normal sensory function, No focal deficits noted.  Strength and sensation.  Psychiatric: Affect normal    LABS  Results for orders placed or performed during the hospital encounter of 11/06/17   DIAGNOSTIC ALCOHOL   Result Value Ref Range    Diagnostic Alcohol 0.00 0.00 g/dL   CBC WITHOUT DIFFERENTIAL   Result Value Ref Range    WBC 6.4 4.8 - 10.8 K/uL    RBC 5.46 4.70 - 6.10 M/uL    Hemoglobin 16.9 14.0 - 18.0 g/dL    Hematocrit 49.4 42.0 - 52.0 %    MCV 90.5 81.4 - 97.8 fL    MCH 31.0 27.0 - 33.0 pg    MCHC 34.2 33.7 - 35.3 g/dL    RDW 39.6 35.9 - 50.0 fL    Platelet Count 200 164 - 446 K/uL    MPV 9.3 9.0 - 12.9 fL   COMP METABOLIC PANEL   Result Value Ref Range    Sodium 138 135 - 145 mmol/L    Potassium 4.1 3.6 - 5.5 mmol/L    Chloride 106 96 - 112 mmol/L    Co2 24 20 - 33 mmol/L    Anion Gap 8.0 0.0 - 11.9    Glucose 117 (H) 65 - 99 mg/dL    Bun 19 8 - 22 mg/dL    Creatinine 0.84 0.50 - 1.40 mg/dL    Calcium 9.2 8.5  - 10.5 mg/dL    AST(SGOT) 23 12 - 45 U/L    ALT(SGPT) 18 2 - 50 U/L    Alkaline Phosphatase 66 30 - 99 U/L    Total Bilirubin 0.5 0.1 - 1.5 mg/dL    Albumin 4.3 3.2 - 4.9 g/dL    Total Protein 7.5 6.0 - 8.2 g/dL    Globulin 3.2 1.9 - 3.5 g/dL    A-G Ratio 1.3 g/dL   PROTHROMBIN TIME   Result Value Ref Range    PT 13.2 12.0 - 14.6 sec    INR 1.03 0.87 - 1.13   APTT   Result Value Ref Range    APTT 22.1 (L) 24.7 - 36.0 sec   COD (ADULT)   Result Value Ref Range    ABO Grouping Only O     Rh Grouping Only NEG     Antibody Screen-Cod NEG    ESTIMATED GFR   Result Value Ref Range    GFR If African American >60 >60 mL/min/1.73 m 2    GFR If Non African American >60 >60 mL/min/1.73 m 2     All labs reviewed by me.    RADIOLOGY  CT-HEAD W/O   Final Result      No acute intracranial abnormality.         CT-TSPINE W/O PLUS RECONS   Final Result   Addendum 1 of 1   These findings were discussed with AGA GARZA on 11/6/2017 12:27 PM.      Final      1.  Fracture of the anterior superior endplate of T7.  Posterior elements are intact.   2.  Congenital mild anterior wedging of multiple thoracic vertebral bodies.   3.  No subluxation.      CT-LSPINE W/O PLUS RECONS   Final Result      1.  Negative for lumbar spine fracture      2.  Multilevel degenerative changes including subluxations at L2-3 and L3-4      CT-CHEST,ABDOMEN,PELVIS WITH   Final Result      1.  No acute abnormality identified within the chest, abdomen, pelvis      2.  Right clavicle, right first rib, and multiple right inferior rib fracture which appear old      3.  Fatty infiltration of liver      4.  Simple hepatic cyst         CT-CSPINE WITHOUT PLUS RECONS   Final Result      1.  No cervical spine fracture or subluxation.   2.  Multilevel degenerative changes.      DX-FOOT-COMPLETE 3+ RIGHT   Final Result      1.  Fractures of the first proximal and distal phalanges      2.  Midfoot and forefoot osteoarthritis      DX-CHEST-LIMITED (1 VIEW)   Final Result       1.  No acute cardiopulmonary abnormality identified.      2.  Probable right clavicle fracture        The radiologist's interpretation of all radiological studies have been reviewed by me.    COURSE & MEDICAL DECISION MAKING  Pertinent Labs & Imaging studies reviewed. (See chart for details)    11:41 AM Patient seen and examined at bedside. The patient presents as a trauma green. He fell from 12 feet, hit his head and got knocked out.  He has neck and back pain.  He also has right-sided chest wall, abdominal pain.  It is indicated.  Rule out Ordered for DX-Chest, CT-Head, CT-L spine, CT-Chest, CT- C spine, CT- T spine, DX-foot complete 3+ right, ABO confirmation, COD, Estimated GFR, Component Cellular, APTT, PTT< CMP, CBC without differential, diagnostic alcohol to evaluate. Patient will be treated with morphine 4 mg/mL for his symptoms.      1:10 PM Patient was reevaluated. Patient reports he is still in pain at this time. I reviewed imaging results which indicated fracture of T7 and fracture of the right first proximal and distal phalanges. CT-Head is still pending. Patient will be treated with repeat morphine 4 mg and will be reevaluated.  She is reassessed, cervical collar was removed.  He had previously been log rolled off the board.  Good range of motion of his neck.  He is given some additional pain medications.    1:51 PM I discussed the patient's case and the above findings with Dr. Ryan (neurosurgeron) who agrees to follow up with patient after discharge.     2:40 PM Patient is resting comfortably. He reports feeling improved at this time. I explained we will place a TLSO brace and evaluate if he can tolerate ambulation.       The patient having a difficult time sitting up.  He is dizzy and lightheaded and feels nauseated.  Patient is a significant pain in his back.  He remains neurologically intact.  The patient will require admission for pain control.    I spoke with orthopedic doctor on call.  The  fellow thought that it dictating in a postop shoe was appropriate.  I communicated this to the nursing staff.  I spoke with Dr. Burton on the trauma surgery service regarding admission for pain control.  Assessment of the neurosurgeon, Dr. bernard.  I then called Dr. Ryan, who agreed to come in with the patient.  The patient is resting comfortably to some pain meds.  He'll be admitted for pain control, possibly PT consultation.    FINAL IMPRESSION  1. Strain of neck muscle, initial encounter    2. Closed wedge compression fracture of seventh thoracic vertebra, initial encounter (CMS-HCC)    3. Fall, initial encounter    4. Closed fracture of right foot, initial encounter     5.  Intractable back pain secondary to fracture     I, Ciara Benavides (Scribe), am scribing for, and in the presence of, No att. providers found.    Electronically signed by: Ciara Benavides (Scribe), 11/6/2017    I, No att. providers found personally performed the services described in this documentation, as scribed by Ciara Benavides in my presence, and it is both accurate and complete.    The note accurately reflects work and decisions made by me.  David Kenyon  11/6/2017  4:53 PM

## 2017-11-07 ENCOUNTER — HOME HEALTH ADMISSION (OUTPATIENT)
Dept: HOME HEALTH SERVICES | Facility: HOME HEALTHCARE | Age: 47
End: 2017-11-07

## 2017-11-07 VITALS
HEIGHT: 72 IN | BODY MASS INDEX: 24.79 KG/M2 | RESPIRATION RATE: 16 BRPM | TEMPERATURE: 98.9 F | HEART RATE: 68 BPM | DIASTOLIC BLOOD PRESSURE: 84 MMHG | SYSTOLIC BLOOD PRESSURE: 128 MMHG | WEIGHT: 183 LBS | OXYGEN SATURATION: 91 %

## 2017-11-07 PROCEDURE — 700102 HCHG RX REV CODE 250 W/ 637 OVERRIDE(OP): Performed by: NEUROLOGICAL SURGERY

## 2017-11-07 PROCEDURE — G8987 SELF CARE CURRENT STATUS: HCPCS | Mod: CI

## 2017-11-07 PROCEDURE — 97161 PT EVAL LOW COMPLEX 20 MIN: CPT

## 2017-11-07 PROCEDURE — G8978 MOBILITY CURRENT STATUS: HCPCS | Mod: CI

## 2017-11-07 PROCEDURE — G8988 SELF CARE GOAL STATUS: HCPCS | Mod: CI

## 2017-11-07 PROCEDURE — G8989 SELF CARE D/C STATUS: HCPCS | Mod: CI

## 2017-11-07 PROCEDURE — 97165 OT EVAL LOW COMPLEX 30 MIN: CPT

## 2017-11-07 PROCEDURE — 700111 HCHG RX REV CODE 636 W/ 250 OVERRIDE (IP): Performed by: NEUROLOGICAL SURGERY

## 2017-11-07 PROCEDURE — A9270 NON-COVERED ITEM OR SERVICE: HCPCS | Performed by: NEUROLOGICAL SURGERY

## 2017-11-07 PROCEDURE — G8979 MOBILITY GOAL STATUS: HCPCS | Mod: CI

## 2017-11-07 PROCEDURE — G8980 MOBILITY D/C STATUS: HCPCS | Mod: CI

## 2017-11-07 RX ORDER — METHOCARBAMOL 750 MG/1
750 TABLET, FILM COATED ORAL 3 TIMES DAILY
Qty: 60 TAB | Refills: 0 | Status: SHIPPED | OUTPATIENT
Start: 2017-11-07 | End: 2018-09-04

## 2017-11-07 RX ORDER — GABAPENTIN 300 MG/1
300 CAPSULE ORAL 3 TIMES DAILY
Qty: 90 CAP | Refills: 0 | Status: SHIPPED | OUTPATIENT
Start: 2017-11-07 | End: 2018-09-04

## 2017-11-07 RX ORDER — HYDROCODONE BITARTRATE AND ACETAMINOPHEN 5; 325 MG/1; MG/1
1-2 TABLET ORAL EVERY 6 HOURS PRN
Qty: 60 TAB | Refills: 0 | Status: SHIPPED | OUTPATIENT
Start: 2017-11-07 | End: 2018-09-04

## 2017-11-07 RX ADMIN — METHOCARBAMOL 750 MG: 750 TABLET ORAL at 09:04

## 2017-11-07 RX ADMIN — KETOROLAC TROMETHAMINE 30 MG: 30 INJECTION, SOLUTION INTRAMUSCULAR at 15:27

## 2017-11-07 RX ADMIN — METHOCARBAMOL 750 MG: 750 TABLET ORAL at 15:27

## 2017-11-07 RX ADMIN — KETOROLAC TROMETHAMINE 30 MG: 30 INJECTION, SOLUTION INTRAMUSCULAR at 06:04

## 2017-11-07 RX ADMIN — GABAPENTIN 300 MG: 300 CAPSULE ORAL at 09:04

## 2017-11-07 RX ADMIN — KETOROLAC TROMETHAMINE 30 MG: 30 INJECTION, SOLUTION INTRAMUSCULAR at 00:11

## 2017-11-07 RX ADMIN — GABAPENTIN 300 MG: 300 CAPSULE ORAL at 15:27

## 2017-11-07 ASSESSMENT — COGNITIVE AND FUNCTIONAL STATUS - GENERAL
DAILY ACTIVITIY SCORE: 22
TURNING FROM BACK TO SIDE WHILE IN FLAT BAD: A LITTLE
MOVING TO AND FROM BED TO CHAIR: A LITTLE
DRESSING REGULAR LOWER BODY CLOTHING: A LITTLE
SUGGESTED CMS G CODE MODIFIER MOBILITY: CJ
SUGGESTED CMS G CODE MODIFIER DAILY ACTIVITY: CJ
HELP NEEDED FOR BATHING: A LITTLE
MOBILITY SCORE: 22

## 2017-11-07 ASSESSMENT — PAIN SCALES - GENERAL
PAINLEVEL_OUTOF10: 2
PAINLEVEL_OUTOF10: 3

## 2017-11-07 ASSESSMENT — ACTIVITIES OF DAILY LIVING (ADL): TOILETING: INDEPENDENT

## 2017-11-07 ASSESSMENT — GAIT ASSESSMENTS
DISTANCE (FEET): 150
GAIT LEVEL OF ASSIST: SUPERVISED
DEVIATION: BRADYKINETIC;SHUFFLED GAIT

## 2017-11-07 NOTE — CARE PLAN
Problem: Pain Management  Goal: Pain level will decrease to patient's comfort goal  Pt calls when pain medication is needed.  Able to express needs and location of pain.

## 2017-11-07 NOTE — THERAPY
"Physical Therapy Evaluation completed.   Bed Mobility:  Supine to Sit: Stand by Assist (logroll cues for sequencing)  Transfers: Sit to Stand: Supervised  Gait: Level Of Assist: Supervised with No Equipment Needed       Plan of Care: Patient with no further skilled PT needs in the acute care setting at this time  Discharge Recommendations: Equipment: No Equipment Needed. Post-acute therapy Discharge to home with outpatient or home health for additional skilled therapy services.    See \"Rehab Therapy-Acute\" Patient Summary Report for complete documentation.   pt provided and educated on lumbar packet and LSO use and purpose. pt seems resistent to use of LSO. discussed risk of damage to spinal cord if pt does not follow his spinal prec and wear his brace. Discussed risk of poor healing and long term back issues. Discussed car trnasfers, dealing with very active 2 y/o son, not able to lift 2 y/o into car or reach into car for buckling seat belt of 2 y/o unless car seat gets moved to 1 side of car to reduce reach into car. Discussed delegating work with his dominic volodymyr and pt reported no having enough staff to cover him while he is off but discussed the con sequences of pt returning back to work prior to his MD clearing him and proper healing of back. discussed wearing a shoe on L foot to even out leg length regarding to sole height of post op shoe. pt left in chair after session. Rn notified of session and mobility.   "

## 2017-11-07 NOTE — PROGRESS NOTES
Received report and assumed pt care.  A&O x4.  Bed alarm and treaded socks on.  Call light and personal belongings within reach.  No s/s of distress or pain.

## 2017-11-07 NOTE — FACE TO FACE
Face to Face Supporting Documentation - Home Health    The encounter with this patient was in whole or in part the primary reason for home health admission.    Date of encounter:   Patient:                    MRN:                       YOB: 2018  Dandy One  [id number]  1970     Home health to see patient for:  Physical Therapy evaluation and treatment and Occupational therapy evaluation and treatment    Skilled need for:  New dx    Skilled nursing interventions to include:  Comment: med management    Homebound status evidenced by:  Require the use of special transportation. Leaving home requires a considerable and taxing effort. There is a normal inability to leave the home.    Community Physician to provide follow up care: No primary care provider on file.     Optional Interventions? No      I certify the face to face encounter for this home health care referral meets the CMS requirements and the encounter/clinical assessment with the patient was, in whole, or in part, for the medical condition(s) listed above, which is the primary reason for home health care. Based on my clinical findings: the service(s) are medically necessary, support the need for home health care, and the homebound criteria are met.  I certify that this patient has had a face to face encounter by myself.  ISMAEL Kyle. - NPI: [id number]

## 2017-11-07 NOTE — PROGRESS NOTES
Discussed with Dr Dhillon     46 y/o M, fall ~12 ft off roof - right great toe intra-articular proximal & distal phalanx fractures.  Associated T7 superior end plate fracture, managed by NSGY    Plan:  WBAT in post-op shoe, bunion tape toe  Full consult note to follow

## 2017-11-07 NOTE — DISCHARGE PLANNING
Medical Social Work    SW met pt at bedside for HH choice - pt chose [facility].  SW faxed HH choice to [facility].

## 2017-11-07 NOTE — H&P
Neurosurgery Consult Note    Patient: Dandy One    MRN: [id number]    Date of Consultation: 11/6/2017    Reason for Consultation: T7 wedge compression fracture without spinal cord injury    Referring Physician: Dr. David Kenyon    History of Present Illness:  The patient currently known as Dandy one is a 47-year-old male who works as a . He was climbing up a ladder to get onto a roof when he lost his balance and fell approximately 12 feet landing on his right side. He was briefly unconscious for about 30 seconds. He is complaining of mid thoracic pain and right toe pain, but there is no numbness or tingling or weakness in his arms or legs.    Review of Systems:  Constitutional: Denies fevers, chills, night sweats, or weight changes  Eyes: Denies changes in vision, or eye pain  Ears/Nose/Throat/Mouth: Denies nasal congestion or sore throat   Cardiovascular: Denies chest pain or palpitations   Respiratory: Denies shortness of breath, or cough  Gastrointestinal/Hepatic: Denies abdominal pain, nausea, vomiting, diarrhea, or constipation  Genitourinary: Denies dysuria, frequency, or incontinence  Musculoskeletal/Rheum: Denies joint pain or swelling   Skin: Denies rash  Neurological: Denies headache, confusion, memory loss, focal weakness, or parasthesias  Psychiatric: Denies mood disorder   Endocrine: Denies hx of diabetes or thyroid dysfunction  Heme/Oncology/Lymph Nodes: Denies enlarged lymph nodes, bruising, or known bleeding disorder  Allergic/Immunologic: Denies hx of autoimmune disorder      Medications:  Current Facility-Administered Medications   Medication Dose Route Frequency Provider Last Rate Last Dose   • hydrocodone-acetaminophen (NORCO) 5-325 MG per tablet 1-2 Tab  1-2 Tab Oral Q6HRS PRN Kole Ryan M.D.       • ketorolac (TORADOL) injection 30 mg  30 mg Intravenous Q6HRS Kole Ryan M.D.       • methocarbamol (ROBAXIN) tablet 750 mg  750 mg Oral 4X/DAY Kole Ryan M.D.       •  gabapentin (NEURONTIN) capsule 300 mg  300 mg Oral TID Kole Ryan M.D.         Current Outpatient Prescriptions   Medication Sig Dispense Refill   • levothyroxine (SYNTHROID) 100 MCG Tab Take 100 mcg by mouth Every morning on an empty stomach.         Allergies:  No Known Allergies    Past Medical History:  No past medical history on file.    Past Surgical History:  No past surgical history on file.    Family History:  No family history on file.    Social History:  Social History     Social History   • Marital status: Single     Spouse name: N/A   • Number of children: N/A   • Years of education: N/A     Occupational History   • Not on file.     Social History Main Topics   • Smoking status: Not on file   • Smokeless tobacco: Not on file   • Alcohol use Not on file   • Drug use: Unknown   • Sexual activity: Not on file     Other Topics Concern   • Not on file     Social History Narrative   • No narrative on file       Physical Examination:  The patient is alert and oriented  Pupils are 3 mm and reactive bilaterally  Extraocular eye movements are intact  Face is symmetric  There is no hemotympanum  There is no cervical spine tenderness  There is no focal weakness in his arms or legs, and power is 5/5  Sensory examination is normal to light touch in all 4 extremities  He does have tenderness in the midthoracic region and it hurts when he breathes    Labs:  Recent Labs      11/06/17   1141   WBC  6.4   RBC  5.46   HEMOGLOBIN  16.9   HEMATOCRIT  49.4   MCV  90.5   MCH  31.0   MCHC  34.2   RDW  39.6   PLATELETCT  200   MPV  9.3     Recent Labs      11/06/17   1141   SODIUM  138   POTASSIUM  4.1   CHLORIDE  106   CO2  24   GLUCOSE  117*   BUN  19   CREATININE  0.84   CALCIUM  9.2     Recent Labs      11/06/17   1141   APTT  22.1*   INR  1.03           Imaging:  CT scan of the head shows no acute intracranial hemorrhage. CT scan of the cervical spine shows no fracture or malalignment. CT scan of the thoracic spine  shows an anterior wedge fracture involving the superior endplate at T7. The posterior elements are intact. CT scan of the lumbar spine shows some degenerative changes but no acute fracture or malalignment.    Assessment and Plan:  The patient currently known as Dandy one is a 47-year-old male who fell approximately 12 feet from a ladder and sustained a T7 anterior wedge compression fracture. There is no spinal cord injury. He does have a right toe injury which is being managed by orthopedics. As there are no other acute injuries patient will be admitted to the neurosurgery service for pain control. The plan is to treat him conservatively in a thoracic brace. We will try to mobilize him with PT/OT once his pain control is reasonable and hopefully we can discharge him home the next day or 2.      Kole Ryan M.D.

## 2017-11-07 NOTE — ED PROVIDER NOTES
Med rec completed by interviewing patient at bedside  Allergies reviewed and updated  No ABX within last 30 days

## 2017-11-07 NOTE — PROGRESS NOTES
Neurosurgery Progress Note    Subjective:  Denies new symptoms.  Eating, ambulating, voiding.  Working with OT    Exam:  BLE strength intact.     BP  Min: 111/72  Max: 128/84  Pulse  Av  Min: 65  Max: 80  Resp  Av.6  Min: 14  Max: 22  Temp  Av.8 °C (98.3 °F)  Min: 36.6 °C (97.9 °F)  Max: 37.2 °C (99 °F)  SpO2  Av.4 %  Min: 91 %  Max: 97 %    No Data Recorded    Recent Labs      17   1141   WBC  6.4   RBC  5.46   HEMOGLOBIN  16.9   HEMATOCRIT  49.4   MCV  90.5   MCH  31.0   MCHC  34.2   RDW  39.6   PLATELETCT  200   MPV  9.3     Recent Labs      17   1141   SODIUM  138   POTASSIUM  4.1   CHLORIDE  106   CO2  24   GLUCOSE  117*   BUN  19     Recent Labs      17   1141   APTT  22.1*   INR  1.03           Intake/Output       17 0700 - 17 0659 17 0700 - 17 0659      [id number] Total [id number] Total       Intake    P.O.  --  300 300  --  -- --    P.O. -- 300 300 -- -- --    I.V.  0  -- 0  --  -- --    Pre-Hospital Volume 0 -- 0 -- -- --    Trauma Resuscitation Volume 0 -- 0 -- -- --    Total Intake 0 300 300 -- -- --       Output    Urine  --  700 700  --  -- --    Number of Times Voided -- -- -- 1 x -- 1 x    Void (ml) -- 700 700 -- -- --    Other  0  -- 0  --  -- --    Pre-Hospital Output 0 -- 0 -- -- --    Trauma Resuscitation Output 0 -- 0 -- -- --    Stool  --  -- --  --  -- --    Number of Times Stooled -- 0 x 0 x -- -- --    Blood  0  -- 0  --  -- --    Est. Blood Loss (mL) 0 -- 0 -- -- --    Total Output 0 700 700 -- -- --       Net I/O     0 -400 -400 -- -- --            Intake/Output Summary (Last 24 hours) at 17 1454  Last data filed at 17 0600   Gross per 24 hour   Intake              300 ml   Output              700 ml   Net             -400 ml            • gabapentin  300 mg TID   • ketorolac  30 mg Q6HRS   • methocarbamol  750 mg 4X/DAY   • hydrocodone-acetaminophen  1-2 Tab Q6HRS PRN       Assessment and  Plan:  Hospital day #2  DC home  Follow up with xrays in two weeks

## 2017-11-07 NOTE — DISCHARGE INSTRUCTIONS
No Aspirin or non-steroidal anti-inflammatory medications (aleve, motrin, ibuprofen, celebrex)   No driving for 2 weeks/ No driving while on narcotic medication   Over the counter stool softeners daily while on narcotics   No lifting greater than 15 pounds,   Follow up at [facility] 2 weeks with xrays    Discharge Instructions    Discharged to home by car with relative. Discharged via wheelchair, hospital escort: Yes.  Special equipment needed: TLSO and Boot    Be sure to schedule a follow-up appointment with your primary care doctor or any specialists as instructed.     Discharge Plan:        I understand that a diet low in cholesterol, fat, and sodium is recommended for good health. Unless I have been given specific instructions below for another diet, I accept this instruction as my diet prescription.   Other diet: Regular as tolerated    Special Instructions: None    · Is patient discharged on Warfarin / Coumadin?   No     · Is patient Post Blood Transfusion?  No    Depression / Suicide Risk    As you are discharged from this [facility] facility, it is important to learn how to keep safe from harming yourself.    Recognize the warning signs:  · Abrupt changes in personality, positive or negative- including increase in energy   · Giving away possessions  · Change in eating patterns- significant weight changes-  positive or negative  · Change in sleeping patterns- unable to sleep or sleeping all the time   · Unwillingness or inability to communicate  · Depression  · Unusual sadness, discouragement and loneliness  · Talk of wanting to die  · Neglect of personal appearance   · Rebelliousness- reckless behavior  · Withdrawal from people/activities they love  · Confusion- inability to concentrate     If you or a loved one observes any of these behaviors or has concerns about self-harm, here's what you can do:  · Talk about it- your feelings and reasons for harming yourself  · Remove any means that you  might use to hurt yourself (examples: pills, rope, extension cords, firearm)  · Get professional help from the community (Mental Health, Substance Abuse, psychological counseling)  · Do not be alone:Call your Safe Contact- someone whom you trust who will be there for you.  · Call your local CRISIS HOTLINE 854-3962 or 300-516-5021  · Call your local Children's Mobile Crisis Response Team [place] (124) 263-1467 or www.impok  · Call the toll free National Suicide Prevention Hotlines   · National Suicide Prevention Lifeline 695-108-QPIA (0193)  · [employer] Line Network 800-SUICIDE (716-7450)    Cervical Sprain  A cervical sprain is an injury in the neck in which the strong, fibrous tissues (ligaments) that connect your neck bones stretch or tear. Cervical sprains can range from mild to severe. Severe cervical sprains can cause the neck vertebrae to be unstable. This can lead to damage of the spinal cord and can result in serious nervous system problems. The amount of time it takes for a cervical sprain to get better depends on the cause and extent of the injury. Most cervical sprains heal in 1 to 3 weeks.  CAUSES   Severe cervical sprains may be caused by:   · Contact sport injuries (such as from football, rugby, wrestling, hockey, auto racing, gymnastics, diving, martial arts, or boxing).    · Motor vehicle collisions.    · Whiplash injuries. This is an injury from a sudden forward and backward whipping movement of the head and neck.   · Falls.    Mild cervical sprains may be caused by:   · Being in an awkward position, such as while cradling a telephone between your ear and shoulder.    · Sitting in a chair that does not offer proper support.    · Working at a poorly designed computer station.    · Looking up or down for long periods of time.    SYMPTOMS   · Pain, soreness, stiffness, or a burning sensation in the front, back, or sides of the neck. This discomfort may develop immediately after the  injury or slowly, 24 hours or more after the injury.    · Pain or tenderness directly in the middle of the back of the neck.    · Shoulder or upper back pain.    · Limited ability to move the neck.    · Headache.    · Dizziness.    · Weakness, numbness, or tingling in the hands or arms.    · Muscle spasms.    · Difficulty swallowing or chewing.    · Tenderness and swelling of the neck.    DIAGNOSIS   Most of the time your health care provider can diagnose a cervical sprain by taking your history and doing a physical exam. Your health care provider will ask about previous neck injuries and any known neck problems, such as arthritis in the neck. X-rays may be taken to find out if there are any other problems, such as with the bones of the neck. Other tests, such as a CT scan or MRI, may also be needed.   TREATMENT   Treatment depends on the severity of the cervical sprain. Mild sprains can be treated with rest, keeping the neck in place (immobilization), and pain medicines. Severe cervical sprains are immediately immobilized. Further treatment is done to help with pain, muscle spasms, and other symptoms and may include:  · Medicines, such as pain relievers, numbing medicines, or muscle relaxants.    · Physical therapy. This may involve stretching exercises, strengthening exercises, and posture training. Exercises and improved posture can help stabilize the neck, strengthen muscles, and help stop symptoms from returning.    HOME CARE INSTRUCTIONS   · Put ice on the injured area.    ¨ Put ice in a plastic bag.    ¨ Place a towel between your skin and the bag.    ¨ Leave the ice on for 15-20 minutes, 3-4 times a day.    · If your injury was severe, you may have been given a cervical collar to wear. A cervical collar is a two-piece collar designed to keep your neck from moving while it heals.  ¨ Do not remove the collar unless instructed by your health care provider.  ¨ If you have long hair, keep it outside of the  collar.  ¨ Ask your health care provider before making any adjustments to your collar. Minor adjustments may be required over time to improve comfort and reduce pressure on your chin or on the back of your head.  ¨ If you are allowed to remove the collar for cleaning or bathing, follow your health care provider's instructions on how to do so safely.  ¨ Keep your collar clean by wiping it with mild soap and water and drying it completely. If the collar you have been given includes removable pads, remove them every 1-2 days and hand wash them with soap and water. Allow them to air dry. They should be completely dry before you wear them in the collar.  ¨ If you are allowed to remove the collar for cleaning and bathing, wash and dry the skin of your neck. Check your skin for irritation or sores. If you see any, tell your health care provider.  ¨ Do not drive while wearing the collar.    · Only take over-the-counter or prescription medicines for pain, discomfort, or fever as directed by your health care provider.    · Keep all follow-up appointments as directed by your health care provider.    · Keep all physical therapy appointments as directed by your health care provider.    · Make any needed adjustments to your workstation to promote good posture.    · Avoid positions and activities that make your symptoms worse.    · Warm up and stretch before being active to help prevent problems.    SEEK MEDICAL CARE IF:   · Your pain is not controlled with medicine.    · You are unable to decrease your pain medicine over time as planned.    · Your activity level is not improving as expected.    SEEK IMMEDIATE MEDICAL CARE IF:   · You develop any bleeding.  · You develop stomach upset.  · You have signs of an allergic reaction to your medicine.    · Your symptoms get worse.    · You develop new, unexplained symptoms.    · You have numbness, tingling, weakness, or paralysis in any part of your body.    MAKE SURE YOU:   · Understand  these instructions.  · Will watch your condition.  · Will get help right away if you are not doing well or get worse.     This information is not intended to replace advice given to you by your health care provider. Make sure you discuss any questions you have with your health care provider.     Document Released: 10/14/2008 Document Revised: 12/23/2014 Document Reviewed: 06/25/2014  [email or web address] Interactive Patient Education ©2016 [employer] Inc[employer]    t    Vertebral Fracture  A vertebral fracture is a break in one of the bones that make up the spine (vertebrae). The vertebrae are stacked on top of each other to form the spinal column. They support the body and protect the spinal cord. The vertebral column has an upper part (cervical spine), a middle part (thoracic spine), and a lower part (lumbar spine). Most vertebral fractures occur in the thoracic spine or lumbar spine.  There are three main types of vertebral fractures:  · Flexion fracture. This happens when vertebrae collapse. Vertebrae can collapse:  ¨ In the front (compression fracture). This type of fracture is common in people who have a condition that causes their bones to be weak and brittle (osteoporosis). The fracture can make a person lose height.  ¨ In the front and back (axial burst fracture).  · Extension fracture. This happens when an external force pulls apart the vertebrae.  · Rotation fracture. This happens when the spine bends extremely in one direction. This type can cause a piece of a vertebra to break off (transverse process fracture) or move out of its normal position (fracture dislocation). This type of fracture has a high risk for spinal cord injury.  Vertebral fractures can range from mild to very severe. The most severe types are those that cause the broken bones to move out of place (unstable) and those that injure or press on the spinal cord.  CAUSES  This condition is usually caused by a forceful injury. This type of injury commonly results  from:  · Car accidents.  · Falling or jumping from a great height.  · Collisions in contact sports.  · Violent acts, such as an assault or a gunshot wound.  RISK FACTORS  This injury is more likely to happen to people who:  · Have osteoporosis.  · Participate in contact sports.  · Are in situations that could result in falls or other violent injuries.  SYMPTOMS  Symptoms of this injury depend on the location and the type of fracture. The most common symptom is back pain that gets worse with movement. You may also have trouble standing or walking. If a fracture has damaged your spinal cord or is pressing on it, you may also have:  · Numbness.  · Tingling.  · Weakness.  · Loss of movement.  · Loss of bowel or bladder control.  DIAGNOSIS  This injury may be diagnosed based on symptoms, medical history, and a physical exam. You may also have imaging tests to confirm the diagnosis. These may include:  · Spine X-ray.  · CT scan.  · MRI.  TREATMENT  Treatment for this injury depends on the type of fracture. If your fracture is stable and does not affect your spinal cord, it may heal with nonsurgical treatment, such as:  · Taking pain medicine.  · Wearing a cast or a brace.  · Doing physical therapy exercises.  If your vertebral fracture is unstable or it affects your spinal cord, you may need surgical treatment, such as:  · Laminectomy. This procedure involves removing the part of a vertebra that is pushing on the spinal cord (spinal decompression surgery). Bone fragments may also be removed.  · Spinal fusion. This procedure is used to stabilize an unstable fracture. Vertebrae may be joined together with a piece of bone from another part of your body (graft) and held in place with rods, plates, or screws.  · Vertebroplasty. In this procedure, bone cement is used to rebuild collapsed vertebrae.  HOME CARE INSTRUCTIONS  General Instructions  · Take medicines only as directed by your health care provider.  · Do not drive or  operate heavy machinery while taking pain medicine.  · If directed, apply ice to the injured area:  ¨ Put ice in a plastic bag.  ¨ Place a towel between your skin and the bag.  ¨ Leave the ice on for 30 minutes every two hours at first. Then apply the ice as needed.  · Wear your neck brace or back brace as directed by your health care provider.  · Do not drink alcohol. Alcohol can interfere with your treatment.  · Keep all follow-up visits as directed by your health care provider. This is important. It can help to prevent permanent injury, disability, and long-lasting (chronic) pain.  Activity  · Stay in bed (on bed rest) only as directed by your health care provider. Being on bed rest for too long can make your condition worse.  · Return to your normal activities as directed by your health care provider. Ask what activities are safe for you.  · Do exercises to improve motion and strength in your back (physical therapy), as recommended by your health care provider.    · Exercise regularly as directed by your health care provider.  SEEK MEDICAL CARE IF:  · You have a fever.  · You develop a cough that makes your pain worse.  · Your pain medicine is not helping.  · Your pain does not get better over time.  · You cannot return to your normal activities as planned or expected.  SEEK IMMEDIATE MEDICAL CARE IF:  · Your pain is very bad and it suddenly gets worse.  · You are unable to move any body part (paralysis) that is below the level of your injury.  · You have numbness, tingling, or weakness in any body part that is below the level of your injury.  · You cannot control your bladder or bowels.     This information is not intended to replace advice given to you by your health care provider. Make sure you discuss any questions you have with your health care provider.     Document Released: 01/25/2006 Document Revised: 05/03/2016 Document Reviewed: 12/23/2015  [employer]vier Interactive Patient Education ©2016 [employer]vier  Inc.        Head Injury, Adult  You have received a head injury. It does not appear serious at this time. Headaches and vomiting are common following head injury. It should be easy to awaken from sleeping. Sometimes it is necessary for you to stay in the emergency department for a while for observation. Sometimes admission to the hospital may be needed. After injuries such as yours, most problems occur within the first 24 hours, but side effects may occur up to 7-10 days after the injury. It is important for you to carefully monitor your condition and contact your health care provider or seek immediate medical care if there is a change in your condition.  WHAT ARE THE TYPES OF HEAD INJURIES?  Head injuries can be as minor as a bump. Some head injuries can be more severe. More severe head injuries include:  · A jarring injury to the brain (concussion).  · A bruise of the brain (contusion). This mean there is bleeding in the brain that can cause swelling.  · A cracked skull (skull fracture).  · Bleeding in the brain that collects, clots, and forms a bump (hematoma).  WHAT CAUSES A HEAD INJURY?  A serious head injury is most likely to happen to someone who is in a car wreck and is not wearing a seat belt. Other causes of major head injuries include bicycle or motorcycle accidents, sports injuries, and falls.  HOW ARE HEAD INJURIES DIAGNOSED?  A complete history of the event leading to the injury and your current symptoms will be helpful in diagnosing head injuries. Many times, pictures of the brain, such as CT or MRI are needed to see the extent of the injury. Often, an overnight hospital stay is necessary for observation.   WHEN SHOULD I SEEK IMMEDIATE MEDICAL CARE?   You should get help right away if:  · You have confusion or drowsiness.  · You feel sick to your stomach (nauseous) or have continued, forceful vomiting.  · You have dizziness or unsteadiness that is getting worse.  · You have severe, continued headaches  not relieved by medicine. Only take over-the-counter or prescription medicines for pain, fever, or discomfort as directed by your health care provider.  · You do not have normal function of the arms or legs or are unable to walk.  · You notice changes in the black spots in the center of the colored part of your eye (pupil).  · You have a clear or bloody fluid coming from your nose or ears.  · You have a loss of vision.  During the next 24 hours after the injury, you must stay with someone who can watch you for the warning signs. This person should contact local emergency services (911 in the [place] if you have seizures, you become unconscious, or you are unable to wake up.  HOW CAN I PREVENT A HEAD INJURY IN THE FUTURE?  The most important factor for preventing major head injuries is avoiding motor vehicle accidents.  To minimize the potential for damage to your head, it is crucial to wear seat belts while riding in motor vehicles. Wearing helmets while bike riding and playing collision sports (like football) is also helpful. Also, avoiding dangerous activities around the house will further help reduce your risk of head injury.   WHEN CAN I RETURN TO NORMAL ACTIVITIES AND ATHLETICS?  You should be reevaluated by your health care provider before returning to these activities. If you have any of the following symptoms, you should not return to activities or contact sports until 1 week after the symptoms have stopped:  · Persistent headache.  · Dizziness or vertigo.  · Poor attention and concentration.  · Confusion.  · Memory problems.  · Nausea or vomiting.  · Fatigue or tire easily.  · Irritability.  · Intolerant of bright lights or loud noises.  · Anxiety or depression.  · Disturbed sleep.  MAKE SURE YOU:   · Understand these instructions.  · Will watch your condition.  · Will get help right away if you are not doing well or get worse.     This information is not intended to replace advice given to you by your health  care provider. Make sure you discuss any questions you have with your health care provider.     Document Released: 12/18/2006 Document Revised: 01/08/2016 Document Reviewed: 08/25/2014  [employer] Interactive Patient Education ©2016 [employer]        Chest Contusion  A chest contusion is a deep bruise on your chest area. Contusions are the result of an injury that caused bleeding under the skin. A chest contusion may involve bruising of the skin, muscles, or ribs. The contusion may turn blue, purple, or yellow. Minor injuries will give you a painless contusion, but more severe contusions may stay painful and swollen for a few weeks.  CAUSES   A contusion is usually caused by a blow, trauma, or direct force to an area of the body.  SYMPTOMS   · Swelling and redness of the injured area.  · Discoloration of the injured area.  · Tenderness and soreness of the injured area.  · Pain.  DIAGNOSIS   The diagnosis can be made by taking a history and performing a physical exam. An X-ray, CT scan, or MRI may be needed to determine if there were any associated injuries, such as broken bones (fractures) or internal injuries.  TREATMENT   Often, the best treatment for a chest contusion is resting, icing, and applying cold compresses to the injured area. Deep breathing exercises may be recommended to reduce the risk of pneumonia. Over-the-counter medicines may also be recommended for pain control.  HOME CARE INSTRUCTIONS   · Put ice on the injured area.  ¨ Put ice in a plastic bag.  ¨ Place a towel between your skin and the bag.  ¨ Leave the ice on for 15-20 minutes, 03-04 times a day.  · Only take over-the-counter or prescription medicines as directed by your caregiver. Your caregiver may recommend avoiding anti-inflammatory medicines (aspirin, ibuprofen, and naproxen) for 48 hours because these medicines may increase bruising.  · Rest the injured area.  · Perform deep-breathing exercises as directed by your caregiver.  · Stop  smoking if you smoke.  · Do not lift objects over 5 pounds (2.3 kg) for 3 days or longer if recommended by your caregiver.  SEEK IMMEDIATE MEDICAL CARE IF:   · You have increased bruising or swelling.  · You have pain that is getting worse.  · You have difficulty breathing.  · You have dizziness, weakness, or fainting.  · You have blood in your urine or stool.  · You cough up or vomit blood.  · Your swelling or pain is not relieved with medicines.  MAKE SURE YOU:   · Understand these instructions.  · Will watch your condition.  · Will get help right away if you are not doing well or get worse.     This information is not intended to replace advice given to you by your health care provider. Make sure you discuss any questions you have with your health care provider.     Document Released: 09/12/2002 Document Revised: 09/11/2013 Document Reviewed: 06/10/2013  [email or web address] Interactive Patient Education ©2016 [employer] Inc.

## 2017-11-07 NOTE — CARE PLAN
Problem: Infection  Goal: Will remain free from infection  No signs or symptoms of infection noted.  Hand hygiene education.

## 2017-11-08 NOTE — PROGRESS NOTES
Patient discharged. IV removed. Reviewed paperwork and provided scripts to patient. No farther questions or concerns..

## 2017-11-08 NOTE — DISCHARGE SUMMARY
DATE OF ADMISSION:  11/06/2017    DATE OF DISCHARGE:  11/07/2017    INJURY SUSTAINED:  T7 fracture on 11/06/2017.    HOSPITAL COURSE:  On the date of admission, the patient was admitted to the   hospital.  He was monitored.  He did fall from a ladder and complained of mid   thoracic pain and right toe pain.  Orthopedics was consulted for a right great   toe fracture, this was evaluated.  _____ fracture was evaluated by Dr. Ryan.  His bilateral upper extremity strength and lower extremity strength   is intact.  Orthopedics has recommended postop shoe with bunion taping.  They   will see the patient in 2-3 weeks.  Dr. Ryan has recommended brace   management.  Today, he notes that he is having no symptoms.  His strength is   intact.  His pain is controlled.  He is eating, ambulating and voiding.  He   has been cleared by PT, he will be discharged home.    DISCHARGE INSTRUCTIONS:  Discharge instructions were given to the patient in   paper format.    DISCHARGE MEDICATIONS:  1.  Norco 10/325 one to two p.o. q. 6 hours p.r.n. pain, #60, no refills.  2.  Gabapentin 300 mg 1 p.o. t.i.d., #90, no refills.  3.  Mobic 7.5 mg p.o. daily #14, no refills.  4.  Robaxin 750 mg 1 p.o. q. 8 hours p.r.n. spasm, #60, no refills.    ASSESSMENT AND PLAN:  1.  Status post above-delineated injury with Dr. Ryan on 11/06/2017.  2.  Patient will follow up in 2 weeks with x-rays.  I will send a note to our   office.  3.  He will follow up with orthopedics, Dr. Chairez as planned.       ____________________________________     LASHONDA Wilkerson / ANNIA    DD:  11/07/2017 17:10:28  DT:  11/08/2017 13:40:12    D#:  [id number]  Job#:  [id number]

## 2017-11-08 NOTE — THERAPY
"Occupational Therapy Evaluation completed.   Functional Status:  SBA supine to sit using log roll.  SBA to don TLSO seated EOB.  Pt able to complete LB dressing with SBA.  Pt walked hallway without AD.  Pt declined practicing tub transfer, stating he feels it will not be an issue. Pt thoroughly educated on spinal precautions, bone healing, body mechanics, ADL's, AE, and importance of TLSO wear.  Pt stating he will return to work in 3 weeks regardless.  Plan of Care: Patient with no further skilled OT needs in the acute care setting at this time  Discharge Recommendations:  Equipment: No Equipment Needed. Post-acute therapy Discharge to home with outpatient or home health for additional skilled therapy services.    See \"Rehab Therapy-Acute\" Patient Summary Report for complete documentation.    "

## 2017-11-20 ENCOUNTER — HOSPITAL ENCOUNTER (OUTPATIENT)
Dept: RADIOLOGY | Facility: MEDICAL CENTER | Age: 47
End: 2017-11-20
Attending: NEUROLOGICAL SURGERY
Payer: COMMERCIAL

## 2017-11-20 DIAGNOSIS — S22.000S: ICD-10-CM

## 2017-11-20 PROCEDURE — 72072 X-RAY EXAM THORAC SPINE 3VWS: CPT

## 2017-12-16 DIAGNOSIS — E03.9 HYPOTHYROIDISM, UNSPECIFIED TYPE: ICD-10-CM

## 2017-12-18 RX ORDER — LEVOTHYROXINE SODIUM 0.1 MG/1
TABLET ORAL
Qty: 100 TAB | Refills: 0 | Status: SHIPPED | OUTPATIENT
Start: 2017-12-18 | End: 2018-08-24 | Stop reason: SDUPTHER

## 2017-12-18 NOTE — TELEPHONE ENCOUNTER
Was the patient seen in the last year in this department? No LOV 06/10/16 lABS 02/08/17    Does patient have an active prescription for medications requested? No     Received Request Via: Pharmacy

## 2018-06-22 NOTE — CONSULTS
Date of Service:  11/6/2017      PCP: No primary care provider on file.    CC:  Right foot pain    HPI: This is a 47 y.o. male who sustained a 10-12 ft fall off a ladder while working on a roof earlier the day of presentation.  He fell onto his right side, brief LOC.  Complains mid-back pain & right great toe pain, improved with oxycodone.  Denies other injuries.    ROS: As above. The remainder of a complete review of systems is negative in all systems except as noted.    PMHx:  Active Ambulatory Problems     Diagnosis Date Noted   • No Active Ambulatory Problems     Resolved Ambulatory Problems     Diagnosis Date Noted   • No Resolved Ambulatory Problems     No Additional Past Medical History       SHx:  Social History     Social History   • Marital status: Single     Spouse name: N/A   • Number of children: N/A   • Years of education: N/A     Occupational History   • Not on file.     Social History Main Topics   • Smoking status: Not on file   • Smokeless tobacco: Not on file   • Alcohol use Not on file   • Drug use: Unknown   • Sexual activity: Not on file     Other Topics Concern   • Not on file     Social History Narrative   • No narrative on file       FHx:  family history is not on file.    Allergies:  No Known Allergies    Medications:  No current facility-administered medications on file prior to encounter.      No current outpatient prescriptions on file prior to encounter.       Objective Exam:  Vitals:    11/06/17 1500 11/06/17 1504 11/06/17 1515 11/06/17 1600   BP:       Pulse: 79 75 80 75   Resp: 14 14 (!) 22 19   Temp:       SpO2: 97% 95% 97% 96%   Weight:       Height:           General: alert, oriented.  conversant  HEENT: atraumatic, complains of neck tightness, no facial abrasions or ecchymoses, mucous membranes moist  Chest: nonlabored breathing  CV: regular rate & rhythm  Abd: soft, nontender  Ext: RLE - moderate swelling & ecchymosis about the great toe.  Pain with toe ROM.  Sensation grossly  preserved over foot.  Foot warm, well-perfused with palpable DP pulse  Neuro: Sensation grossly preserved throughout  Skin: intact    Laboratory--reviewed personally and are as follows:  Lab Results   Component Value Date/Time    WBC 6.4 11/06/2017 11:41 AM    RBC 5.46 11/06/2017 11:41 AM    HEMOGLOBIN 16.9 11/06/2017 11:41 AM    HEMATOCRIT 49.4 11/06/2017 11:41 AM    MCV 90.5 11/06/2017 11:41 AM    MCH 31.0 11/06/2017 11:41 AM    MCHC 34.2 11/06/2017 11:41 AM    MPV 9.3 11/06/2017 11:41 AM      Lab Results   Component Value Date/Time    SODIUM 138 11/06/2017 11:41 AM    POTASSIUM 4.1 11/06/2017 11:41 AM    CHLORIDE 106 11/06/2017 11:41 AM    CO2 24 11/06/2017 11:41 AM    GLUCOSE 117 (H) 11/06/2017 11:41 AM    BUN 19 11/06/2017 11:41 AM    CREATININE 0.84 11/06/2017 11:41 AM      Lab Results   Component Value Date/Time    PROTHROMBTM 13.2 11/06/2017 11:41 AM    INR 1.03 11/06/2017 11:41 AM        Radiology  3 views right foot - intra-articular great toe proximal phalanx & distal phalanx fractures    Assessment/Plan:  46 y/o M with right great toe fracture.  Associated T7 superior end-plate fracture    - Will manage this injury nonoperatively, discussed with patient  - WBAT in post-op shoe, bunion taping  - T7 fracture - TLSO per NSGY  - PT/OT eval  - MSK secondary survey  - will plan for outpatient follow-up in 2-3 weeks     (2) assistive person

## 2018-08-24 ENCOUNTER — HOSPITAL ENCOUNTER (OUTPATIENT)
Dept: LAB | Facility: MEDICAL CENTER | Age: 48
End: 2018-08-24
Attending: FAMILY MEDICINE
Payer: COMMERCIAL

## 2018-08-24 ENCOUNTER — OFFICE VISIT (OUTPATIENT)
Dept: MEDICAL GROUP | Facility: PHYSICIAN GROUP | Age: 48
End: 2018-08-24
Payer: COMMERCIAL

## 2018-08-24 VITALS
HEIGHT: 72 IN | BODY MASS INDEX: 24.11 KG/M2 | DIASTOLIC BLOOD PRESSURE: 78 MMHG | SYSTOLIC BLOOD PRESSURE: 130 MMHG | HEART RATE: 75 BPM | WEIGHT: 178 LBS | TEMPERATURE: 97.7 F | OXYGEN SATURATION: 98 %

## 2018-08-24 DIAGNOSIS — E55.9 VITAMIN D DEFICIENCY DISEASE: ICD-10-CM

## 2018-08-24 DIAGNOSIS — B00.2 RECURRENT ORAL HERPES SIMPLEX: ICD-10-CM

## 2018-08-24 DIAGNOSIS — E03.4 HYPOTHYROIDISM DUE TO ACQUIRED ATROPHY OF THYROID: ICD-10-CM

## 2018-08-24 DIAGNOSIS — E78.2 MIXED HYPERLIPIDEMIA: ICD-10-CM

## 2018-08-24 DIAGNOSIS — E03.9 HYPOTHYROIDISM, UNSPECIFIED TYPE: ICD-10-CM

## 2018-08-24 DIAGNOSIS — Z00.00 HEALTH CARE MAINTENANCE: ICD-10-CM

## 2018-08-24 DIAGNOSIS — Z00.00 HEALTH CARE MAINTENANCE: Primary | ICD-10-CM

## 2018-08-24 LAB
25(OH)D3 SERPL-MCNC: 25 NG/ML (ref 30–100)
ALBUMIN SERPL BCP-MCNC: 4.1 G/DL (ref 3.2–4.9)
ALBUMIN/GLOB SERPL: 1.3 G/DL
ALP SERPL-CCNC: 64 U/L (ref 30–99)
ALT SERPL-CCNC: 16 U/L (ref 2–50)
ANION GAP SERPL CALC-SCNC: 8 MMOL/L (ref 0–11.9)
APPEARANCE UR: CLEAR
AST SERPL-CCNC: 16 U/L (ref 12–45)
BASOPHILS # BLD AUTO: 0.4 % (ref 0–1.8)
BASOPHILS # BLD: 0.02 K/UL (ref 0–0.12)
BILIRUB SERPL-MCNC: 0.4 MG/DL (ref 0.1–1.5)
BILIRUB UR QL STRIP.AUTO: NEGATIVE
BUN SERPL-MCNC: 20 MG/DL (ref 8–22)
CALCIUM SERPL-MCNC: 9.2 MG/DL (ref 8.5–10.5)
CHLORIDE SERPL-SCNC: 106 MMOL/L (ref 96–112)
CHOLEST SERPL-MCNC: 194 MG/DL (ref 100–199)
CO2 SERPL-SCNC: 26 MMOL/L (ref 20–33)
COLOR UR: YELLOW
CREAT SERPL-MCNC: 0.92 MG/DL (ref 0.5–1.4)
EOSINOPHIL # BLD AUTO: 0.12 K/UL (ref 0–0.51)
EOSINOPHIL NFR BLD: 2.3 % (ref 0–6.9)
ERYTHROCYTE [DISTWIDTH] IN BLOOD BY AUTOMATED COUNT: 41.8 FL (ref 35.9–50)
GLOBULIN SER CALC-MCNC: 3.1 G/DL (ref 1.9–3.5)
GLUCOSE SERPL-MCNC: 81 MG/DL (ref 65–99)
GLUCOSE UR STRIP.AUTO-MCNC: NEGATIVE MG/DL
HCT VFR BLD AUTO: 48.6 % (ref 42–52)
HDLC SERPL-MCNC: 46 MG/DL
HGB BLD-MCNC: 16.1 G/DL (ref 14–18)
IMM GRANULOCYTES # BLD AUTO: 0.01 K/UL (ref 0–0.11)
IMM GRANULOCYTES NFR BLD AUTO: 0.2 % (ref 0–0.9)
KETONES UR STRIP.AUTO-MCNC: NEGATIVE MG/DL
LDLC SERPL CALC-MCNC: 122 MG/DL
LEUKOCYTE ESTERASE UR QL STRIP.AUTO: NEGATIVE
LYMPHOCYTES # BLD AUTO: 2.03 K/UL (ref 1–4.8)
LYMPHOCYTES NFR BLD: 39 % (ref 22–41)
MCH RBC QN AUTO: 31 PG (ref 27–33)
MCHC RBC AUTO-ENTMCNC: 33.1 G/DL (ref 33.7–35.3)
MCV RBC AUTO: 93.5 FL (ref 81.4–97.8)
MICRO URNS: NORMAL
MONOCYTES # BLD AUTO: 0.46 K/UL (ref 0–0.85)
MONOCYTES NFR BLD AUTO: 8.8 % (ref 0–13.4)
NEUTROPHILS # BLD AUTO: 2.57 K/UL (ref 1.82–7.42)
NEUTROPHILS NFR BLD: 49.3 % (ref 44–72)
NITRITE UR QL STRIP.AUTO: NEGATIVE
NRBC # BLD AUTO: 0 K/UL
NRBC BLD-RTO: 0 /100 WBC
PH UR STRIP.AUTO: 6 [PH]
PLATELET # BLD AUTO: 183 K/UL (ref 164–446)
PMV BLD AUTO: 10.8 FL (ref 9–12.9)
POTASSIUM SERPL-SCNC: 4.1 MMOL/L (ref 3.6–5.5)
PROT SERPL-MCNC: 7.2 G/DL (ref 6–8.2)
PROT UR QL STRIP: NEGATIVE MG/DL
RBC # BLD AUTO: 5.2 M/UL (ref 4.7–6.1)
RBC UR QL AUTO: NEGATIVE
SODIUM SERPL-SCNC: 140 MMOL/L (ref 135–145)
SP GR UR STRIP.AUTO: 1.03
T4 FREE SERPL-MCNC: 1 NG/DL (ref 0.53–1.43)
TRIGL SERPL-MCNC: 128 MG/DL (ref 0–149)
TSH SERPL DL<=0.005 MIU/L-ACNC: 1.19 UIU/ML (ref 0.38–5.33)
UROBILINOGEN UR STRIP.AUTO-MCNC: 0.2 MG/DL
WBC # BLD AUTO: 5.2 K/UL (ref 4.8–10.8)

## 2018-08-24 PROCEDURE — 85025 COMPLETE CBC W/AUTO DIFF WBC: CPT

## 2018-08-24 PROCEDURE — 36415 COLL VENOUS BLD VENIPUNCTURE: CPT

## 2018-08-24 PROCEDURE — 80053 COMPREHEN METABOLIC PANEL: CPT

## 2018-08-24 PROCEDURE — 81003 URINALYSIS AUTO W/O SCOPE: CPT

## 2018-08-24 PROCEDURE — 82306 VITAMIN D 25 HYDROXY: CPT

## 2018-08-24 PROCEDURE — 84439 ASSAY OF FREE THYROXINE: CPT

## 2018-08-24 PROCEDURE — 84443 ASSAY THYROID STIM HORMONE: CPT

## 2018-08-24 PROCEDURE — 99396 PREV VISIT EST AGE 40-64: CPT | Performed by: FAMILY MEDICINE

## 2018-08-24 PROCEDURE — 80061 LIPID PANEL: CPT

## 2018-08-24 RX ORDER — LEVOTHYROXINE SODIUM 0.1 MG/1
TABLET ORAL
Qty: 100 TAB | Refills: 3
Start: 2018-08-24

## 2018-08-24 RX ORDER — FAMCICLOVIR 500 MG/1
500 TABLET ORAL 2 TIMES DAILY
Qty: 14 TAB | Refills: 3 | Status: SHIPPED | OUTPATIENT
Start: 2018-08-24 | End: 2018-09-04

## 2018-08-24 ASSESSMENT — PAIN SCALES - GENERAL: PAINLEVEL: 2=MINIMAL-SLIGHT

## 2018-08-24 NOTE — PROGRESS NOTES
Patient comes in for annual exam.  I have not seen him for quite some time.  He is having some joint and muscle aches and pains but he equates that to simply getting older.  He continues to work as a .  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  I think this would be a bustillo move for him.  Last year, in fact, he fell off a ladder and fractured his thoracic spine.  He still recovering from this.  He is due for lab work.      Review of Systems   Constitutional: Negative.  Negative for fever, chills, weight loss and malaise/fatigue.   HENT: Negative for hearing loss, ear pain, congestion, sore throat, neck pain and tinnitus.    Eyes: Negative for blurred vision, double vision and pain.   Respiratory: Negative for cough, hemoptysis, shortness of breath and wheezing.    Cardiovascular: Negative for chest pain, palpitations, orthopnea, claudication, leg swelling and PND.   Gastrointestinal: Negative for heartburn, nausea, vomiting, abdominal pain, diarrhea, constipation, blood in stool and melena.   Genitourinary: Negative for incontinence, dysuria, urgency, frequency and hematuria.  Male--negative for erectile dysfunction  Musculoskeletal: Negative for myalgias, positive for resolving thoracic back pain and intermittent joint pains.   Skin: Negative for rash and itching. No lesions that will not heal.  Neurological: Negative for dizziness, tingling, tremors, focal weakness, seizures, loss of consciousness and headaches.   Endo/Heme/Allergies: Negative for environmental allergies and polydipsia.  Does not bruise/bleed easily.   Psychiatric/Behavioral: Negative for depression, memory loss and substance abuse.  The patient is not nervous/anxious and does not have insomnia.  All others negative.    I reviewed the following    Past Medical History:   Diagnosis Date   • Herpetic gingivostomatitis    • Inguinal  hernia    • Substance abuse    • Thyroid disease         Past Surgical History:   Procedure Laterality Date   • KNEE ARTHROSCOPY Right 11/2015    Dr Colbert  Meniscus Repair and Clean Out   • HERNIA REPAIR  10/2009    right sided       No Known Allergies    Current Outpatient Prescriptions   Medication Sig Dispense Refill   • levothyroxine (SYNTHROID) 100 MCG Tab TAKE 1 TABLET BY MOUTH MONDAY THROUGH SATURDAY. SKIP SUNDAYS 100 Tab 3   • famciclovir (FAMVIR) 500 MG Tab Take 1 Tab by mouth 2 Times a Day. 14 Tab 3   • hydrocodone-acetaminophen (NORCO) 5-325 MG Tab per tablet Take 1-2 Tabs by mouth every 6 hours as needed. 60 Tab 0   • gabapentin (NEURONTIN) 300 MG Cap Take 1 Cap by mouth 3 times a day. 90 Cap 0   • methocarbamol (ROBAXIN) 750 MG Tab Take 1 Tab by mouth 3 times a day. 60 Tab 0   • buPROPion SR (WELLBUTRIN-SR) 150 MG TABLET SR 12 HR sustained-release tablet TAKE 1 TABLET BY MOUTH TWICE DAILY 180 Tab 1     No current facility-administered medications for this visit.         Family History   Problem Relation Age of Onset   • Lung Disease Mother         Pulmonary fibrosis       Social History     Social History   • Marital status: Single     Spouse name: N/A   • Number of children: N/A   • Years of education: N/A     Occupational History   • Not on file.     Social History Main Topics   • Smoking status: Former Smoker     Packs/day: 1.00     Years: 25.00     Quit date: 3/28/2013   • Smokeless tobacco: Never Used      Comment: still smoke on rare occasions    • Alcohol use Yes      Comment: moderate   • Drug use: No   • Sexual activity: Yes     Partners: Female     Other Topics Concern   • Not on file     Social History Narrative   • No narrative on file        Physical Exam   Nursing note and vitals reviewed.  Constitutional: He is oriented. He appears well-developed and well-nourished. He appears not diaphoretic. No distress.   Head: Normocephalic and atraumatic.   Right Ear: External ear normal. Ear canal  and TM normal  Left Ear: External ear normal. Ear canal and TM normal  Nose: Nose normal.   Mouth/Throat: He has a resolving oral herpes simplex lesion on his right lower lip.  Oropharynx is clear and moist. No oropharyngeal exudate.   Eyes: Conjunctivae and extraocular motions are normal. Pupils are equal, round, and reactive to light. Right eye exhibits no discharge. Left eye exhibits no discharge. No scleral icterus. Fundi benign bilaterally   Neck: Normal range of motion. Neck supple. No JVD present. No tracheal deviation present. No thyromegaly present.   Cardiovascular: Normal rate, regular rhythm, normal heart sounds and intact distal pulses.  Exam reveals no gallop and no friction rub.    No murmur heard.  Pulmonary/Chest: Effort normal and breath sounds normal. No stridor. No respiratory distress. He has no wheezes. He has no rales. He exhibits no tenderness.   Abdominal: Soft. Bowel sounds are normal. He exhibits no distension and no mass. No tenderness. No hepatosplenomegaly. He has no rebound and no guarding. Hernia confirmed negative in the right inguinal area and confirmed negative in the left inguinal area.   Genitourinary: Penis normal.  Right testis shows no mass, no swelling, no tenderness. Right testis is descended. Left testis shows no mass, no swelling, no tenderness. Left testis is descended. Circumcised. No phimosis, penile erythema or penile tenderness. No discharge found.   Musculoskeletal: Normal range of motion. He exhibits no edema and no tenderness.   Lymphadenopathy:     He has no cervical adenopathy.   No supraclavicular adenopathy.       Right: No inguinal adenopathy present.        Left: No inguinal adenopathy present.   Neurological: He is alert and oriented. He displays normal reflexes. No cranial nerve deficit. He exhibits normal muscle tone. Coordination normal.   Skin: Skin is warm and dry. No rash noted. He is not diaphoretic. No erythema. No pallor.   Psychiatric: He has a  normal mood and appropriate affect. His behavior is normal. Judgment and thought content normal.    1. Health care maintenance--doing well CBC WITH DIFFERENTIAL    COMP METABOLIC PANEL    LIPID PROFILE    URINALYSIS,CULTURE IF INDICATED   2. Mixed hyperlipidemia --needs assessment COMP METABOLIC PANEL    LIPID PROFILE   3. Vitamin D deficiency disease --needs assessment VITAMIN D 25-HYDROXY   4. Hypothyroidism due to acquired atrophy of thyroid --needs assessment TSH    FREE THYROXINE   5. Hypothyroidism, unspecified type  levothyroxine (SYNTHROID) 100 MCG Tab--he buys this in [place] over-the-counter.   6. Recurrent oral herpes simplex  famciclovir (FAMVIR) 500 MG Tab--refill     Recheck with me in 1 year or as needed    Please note that this dictation was created using voice recognition software. I have worked with consultants from the vendor as well as technical experts from [employer] to optimize the interface. I have made every reasonable attempt to correct obvious errors, but I expect that there are errors of grammar and possibly content that I did not discover before finalizing the note.

## 2018-08-24 NOTE — PATIENT INSTRUCTIONS
Patient given written instructions regarding labs,  medications, referrals, dietary and lifestyle management, and return visit.    Modesto Navas MD

## 2018-08-26 DIAGNOSIS — R79.89 LOW VITAMIN D LEVEL: ICD-10-CM

## 2018-08-26 RX ORDER — MELATONIN
2000 DAILY
Qty: 100 TAB | Refills: 3
Start: 2018-08-26 | End: 2018-09-04

## 2018-09-04 ENCOUNTER — APPOINTMENT (OUTPATIENT)
Dept: ADMISSIONS | Facility: MEDICAL CENTER | Age: 48
End: 2018-09-04
Attending: ORTHOPAEDIC SURGERY
Payer: COMMERCIAL

## 2018-09-04 RX ORDER — ACETAMINOPHEN 160 MG
6000 TABLET,DISINTEGRATING ORAL DAILY
COMMUNITY

## 2018-09-04 RX ORDER — IBUPROFEN 200 MG
200 TABLET ORAL EVERY 6 HOURS PRN
COMMUNITY
End: 2019-01-24

## 2018-09-05 ENCOUNTER — HOSPITAL ENCOUNTER (OUTPATIENT)
Facility: MEDICAL CENTER | Age: 48
End: 2018-09-05
Attending: ORTHOPAEDIC SURGERY | Admitting: ORTHOPAEDIC SURGERY
Payer: COMMERCIAL

## 2018-09-05 VITALS
WEIGHT: 181.22 LBS | OXYGEN SATURATION: 96 % | SYSTOLIC BLOOD PRESSURE: 134 MMHG | HEIGHT: 72 IN | BODY MASS INDEX: 24.55 KG/M2 | DIASTOLIC BLOOD PRESSURE: 99 MMHG | HEART RATE: 57 BPM | TEMPERATURE: 97.7 F | RESPIRATION RATE: 14 BRPM

## 2018-09-05 DIAGNOSIS — S83.232A COMPLEX TEAR OF MEDIAL MENISCUS OF LEFT KNEE, INITIAL ENCOUNTER: ICD-10-CM

## 2018-09-05 PROCEDURE — 160041 HCHG SURGERY MINUTES - EA ADDL 1 MIN LEVEL 4: Performed by: ORTHOPAEDIC SURGERY

## 2018-09-05 PROCEDURE — 302135 SEQUENTIAL COMPRESSION MACHINE: Performed by: ORTHOPAEDIC SURGERY

## 2018-09-05 PROCEDURE — 160009 HCHG ANES TIME/MIN: Performed by: ORTHOPAEDIC SURGERY

## 2018-09-05 PROCEDURE — 700102 HCHG RX REV CODE 250 W/ 637 OVERRIDE(OP)

## 2018-09-05 PROCEDURE — 700101 HCHG RX REV CODE 250

## 2018-09-05 PROCEDURE — A9270 NON-COVERED ITEM OR SERVICE: HCPCS

## 2018-09-05 PROCEDURE — 160029 HCHG SURGERY MINUTES - 1ST 30 MINS LEVEL 4: Performed by: ORTHOPAEDIC SURGERY

## 2018-09-05 PROCEDURE — 160002 HCHG RECOVERY MINUTES (STAT): Performed by: ORTHOPAEDIC SURGERY

## 2018-09-05 PROCEDURE — 160025 RECOVERY II MINUTES (STATS): Performed by: ORTHOPAEDIC SURGERY

## 2018-09-05 PROCEDURE — 160035 HCHG PACU - 1ST 60 MINS PHASE I: Performed by: ORTHOPAEDIC SURGERY

## 2018-09-05 PROCEDURE — 160048 HCHG OR STATISTICAL LEVEL 1-5: Performed by: ORTHOPAEDIC SURGERY

## 2018-09-05 PROCEDURE — 160046 HCHG PACU - 1ST 60 MINS PHASE II: Performed by: ORTHOPAEDIC SURGERY

## 2018-09-05 PROCEDURE — 700111 HCHG RX REV CODE 636 W/ 250 OVERRIDE (IP)

## 2018-09-05 PROCEDURE — 501838 HCHG SUTURE GENERAL: Performed by: ORTHOPAEDIC SURGERY

## 2018-09-05 PROCEDURE — 700105 HCHG RX REV CODE 258: Performed by: ORTHOPAEDIC SURGERY

## 2018-09-05 RX ORDER — PROMETHAZINE HYDROCHLORIDE 25 MG/1
25 TABLET ORAL EVERY 6 HOURS PRN
Qty: 10 TAB | Refills: 0 | Status: SHIPPED | OUTPATIENT
Start: 2018-09-05 | End: 2019-03-13

## 2018-09-05 RX ORDER — ROPIVACAINE HYDROCHLORIDE 5 MG/ML
INJECTION, SOLUTION EPIDURAL; INFILTRATION; PERINEURAL
Status: DISCONTINUED | OUTPATIENT
Start: 2018-09-05 | End: 2018-09-05 | Stop reason: HOSPADM

## 2018-09-05 RX ORDER — ACETAMINOPHEN 500 MG
TABLET ORAL
Status: DISCONTINUED
Start: 2018-09-05 | End: 2018-09-05 | Stop reason: HOSPADM

## 2018-09-05 RX ORDER — HYDROCODONE BITARTRATE AND ACETAMINOPHEN 5; 325 MG/1; MG/1
1-2 TABLET ORAL EVERY 6 HOURS PRN
Qty: 15 TAB | Refills: 0 | Status: SHIPPED | OUTPATIENT
Start: 2018-09-05 | End: 2018-09-10

## 2018-09-05 RX ORDER — CELECOXIB 200 MG/1
CAPSULE ORAL
Status: DISCONTINUED
Start: 2018-09-05 | End: 2018-09-05 | Stop reason: HOSPADM

## 2018-09-05 RX ORDER — SODIUM CHLORIDE, SODIUM LACTATE, POTASSIUM CHLORIDE, CALCIUM CHLORIDE 600; 310; 30; 20 MG/100ML; MG/100ML; MG/100ML; MG/100ML
1000 INJECTION, SOLUTION INTRAVENOUS
Status: DISCONTINUED | OUTPATIENT
Start: 2018-09-05 | End: 2018-09-05 | Stop reason: HOSPADM

## 2018-09-05 RX ORDER — OXYCODONE HCL 5 MG/5 ML
SOLUTION, ORAL ORAL
Status: COMPLETED
Start: 2018-09-05 | End: 2018-09-05

## 2018-09-05 RX ADMIN — SODIUM CHLORIDE, POTASSIUM CHLORIDE, SODIUM LACTATE AND CALCIUM CHLORIDE 1000 ML: 600; 310; 30; 20 INJECTION, SOLUTION INTRAVENOUS at 09:45

## 2018-09-05 RX ADMIN — OXYCODONE HYDROCHLORIDE 5 MG: 5 SOLUTION ORAL at 11:46

## 2018-09-05 ASSESSMENT — PAIN SCALES - GENERAL
PAINLEVEL_OUTOF10: 0
PAINLEVEL_OUTOF10: 3
PAINLEVEL_OUTOF10: 4
PAINLEVEL_OUTOF10: 3
PAINLEVEL_OUTOF10: 2

## 2018-09-05 NOTE — OR NURSING
1132- Pt to pacu via gurney with side rails up. Jaw thrust preformed to open airway. VSS.  L knee dressing cdi, 2+ L pedal pulse noted.  1134- Pt still obstructing airway, OPA inserted. O2 sats improving, now 94%.  1140- Pt arousing, OPA DC'd.  VSS.  1147- Pt c/o pain 3/10, see mar for med administered.  Pt denies nausea, able to wiggle L toes, yes to sensation.  1205- wife updated via phone.  Report to Hattie HAMMONDS.

## 2018-09-05 NOTE — DISCHARGE INSTRUCTIONS
ACTIVITY: Rest and take it easy for the first 24 hours.  A responsible adult is recommended to remain with you during that time.  It is normal to feel sleepy.  We encourage you to not do anything that requires balance, judgment or coordination.    MILD FLU-LIKE SYMPTOMS ARE NORMAL. YOU MAY EXPERIENCE GENERALIZED MUSCLE ACHES, THROAT IRRITATION, HEADACHE AND/OR SOME NAUSEA.    FOR 24 HOURS DO NOT:  Drive, operate machinery or run household appliances.  Drink beer or alcoholic beverages.   Make important decisions or sign legal documents.    SPECIAL INSTRUCTIONS:  Non weight bearing Left lower extremity. Ice and elevate extremity.     DIET: To avoid nausea, slowly advance diet as tolerated, avoiding spicy or greasy foods for the first day.  Add more substantial food to your diet according to your physician's instructions.  Babies can be fed formula or breast milk as soon as they are hungry.  INCREASE FLUIDS AND FIBER TO AVOID CONSTIPATION.    SURGICAL DRESSING/BATHING: May remove dressings Post op Day #2 and Shower with wound uncovered.  Apply bandaids after shower.  Do not soak or submerge incisions for two weeks.    FOLLOW-UP APPOINTMENT:  A follow-up appointment should be arranged with your doctor in 7-10 days; call to schedule.    You should CALL YOUR PHYSICIAN if you develop:  Fever greater than 101 degrees F.  Pain not relieved by medication, or persistent nausea or vomiting.  Excessive bleeding (blood soaking through dressing) or unexpected drainage from the wound.  Extreme redness or swelling around the incision site, drainage of pus or foul smelling drainage.  Inability to urinate or empty your bladder within 8 hours.  Problems with breathing or chest pain.    You should call 911 if you develop problems with breathing or chest pain.  If you are unable to contact your doctor or surgical center, you should go to the nearest emergency room or urgent care center.  Physician's telephone #: Dr Colbert  [phone]    If any questions arise, call your doctor.  If your doctor is not available, please feel free to call the Surgical Center at [phone].  The Center is open Monday through Friday from 7AM to 7PM.  You can also call the HEALTH HOTLINE open 24 hours/day, 7 days/week and speak to a nurse at [phone], or toll free at [phone].    A registered nurse may call you a few days after your surgery to see how you are doing after your procedure.    MEDICATIONS: Resume taking daily medication.  Take prescribed pain medication with food.  If no medication is prescribed, you may take non-aspirin pain medication if needed.  PAIN MEDICATION CAN BE VERY CONSTIPATING.  Take a stool softener or laxative such as senokot, pericolace, or milk of magnesia if needed.    Prescription given for Phenergan and Norco.  Last pain medication given at 11:46am oxycodone 5mg.    If your physician has prescribed pain medication that includes Acetaminophen (Tylenol), do not take additional Acetaminophen (Tylenol) while taking the prescribed medication.    Depression / Suicide Risk    As you are discharged from this [facility] facility, it is important to learn how to keep safe from harming yourself.    Recognize the warning signs:  · Abrupt changes in personality, positive or negative- including increase in energy   · Giving away possessions  · Change in eating patterns- significant weight changes-  positive or negative  · Change in sleeping patterns- unable to sleep or sleeping all the time   · Unwillingness or inability to communicate  · Depression  · Unusual sadness, discouragement and loneliness  · Talk of wanting to die  · Neglect of personal appearance   · Rebelliousness- reckless behavior  · Withdrawal from people/activities they love  · Confusion- inability to concentrate     If you or a loved one observes any of these behaviors or has concerns about self-harm, here's what you can do:  · Talk about it- your  feelings and reasons for harming yourself  · Remove any means that you might use to hurt yourself (examples: pills, rope, extension cords, firearm)  · Get professional help from the community (Mental Health, Substance Abuse, psychological counseling)  · Do not be alone:Call your Safe Contact- someone whom you trust who will be there for you.  · Call your local CRISIS HOTLINE 190-6210 or 555-497-9437  · Call your local Children's Mobile Crisis Response Team [place] (791) 594-4138 or www.[employer]  · Call the toll free National Suicide Prevention Hotlines   · National Suicide Prevention Lifeline 075-033-CZUW (7419)  · National Hope Line Network 800-SUICIDE (957-2053)

## 2018-09-05 NOTE — OP REPORT
DATE OF SERVICE:  09/05/2018    PREOPERATIVE DIAGNOSES:  1.  Left knee medial meniscus tear.  2.  Left knee chondromalacia.    POSTOPERATIVE DIAGNOSES:  1.  Left knee medial meniscus tear.  2.  Left knee chondromalacia.  3.  Left knee lateral meniscus tear.    PROCEDURES PEROFRMED:  Left knee arthroscopy with partial medial meniscectomy,   lateral meniscectomy, and chondroplasty.    ANESTHESIOLOGIST:  Ector Oquendo MD    ANESTHETIC:  LMA anesthesia along with local injection.    INDICATIONS:  The patient has had left knee pain for quite some time.  He has   failed nonoperative treatment, elected to proceed with operative intervention.    He was explained the risks, benefits, alternative procedure and recovery in   detail.  All questions were answered, informed consent was obtained.  Site   verification per protocol was done in the preop holding area and the operating   room.  Patient received appropriate preoperative antibiotics.    OPERATION:  After successful anesthesia, patient's left knee was examined.  He   had good range of motion, no evidence of instability.  Leg was then prepped   and draped in the usual sterile fashion.  Anterolateral portal was established   with knife and blunt trocar in the suprapatellar pouch.  The suprapatellar   pouch, medial and lateral gutters were free of abnormalities.  Patella had   some minor chondromalacia on the medial side and trochlea had some central   chondromalacia with some loose edges and flaps.  This was debrided from   multiple portals with a shaver and basket and just a gentle synovectomy was   performed.  The medial joint line revealed some mild chondromalacia and a   large tear of the medial meniscus in the posterior horn. This was debrided   back with murali and baskets to a stable margin.  I then probed it, felt it   was stable.  It lost about 30% of that posterior horn.  The notch showed   normal ACL and PCL.  The lateral joint had normal articular cartilage  and   essentially normal lateral meniscus, but the posterior root had some superior   fraying, but was not detached.  This was debrided back with murali and   baskets to a stable margin and probed, felt to be stable.  Popliteus was   normal.  At that point, I was happy with the procedure.  I lavaged out the   joint, suctioned the fluid, closed the portals with 3-0 Prolene in interrupted   fashion, Xeroform, 4x4's, and ACE wrap was applied.    ESTIMATED BLOOD LOSS:  Minimal.    COMPLICATIONS:  None.    Michael Cosme PA-C, was medically necessary for the case.  He helped with   instrumentation, retraction, and positioning.  Without his help, the case   would not have been as technically successful.       ____________________________________     MD IKE GRUBBS / NTS    DD:  09/05/2018 12:08:47  DT:  09/05/2018 12:38:16    D#:  [id number]  Job#:  [id number]

## 2018-09-05 NOTE — OR NURSING
1214: Pt arrived post L knee arthroscopy, RN report, Pt settled into recliner/dressed, Pain is tolerable/min and denies nausea, Dressing intact/ice pack in place/+L pedal pulse  1230: Pt sleeping, Awaiting wife  1235: Dc instructions completed with pt/wife, Verbalized understanding, Prescriptions to wife, Verified WB status with MD-pt is WBAT  1250: IV removed  1257: Pt discharged via WC/CNA

## 2019-01-02 ENCOUNTER — OFFICE VISIT (OUTPATIENT)
Dept: MEDICAL GROUP | Facility: PHYSICIAN GROUP | Age: 49
End: 2019-01-02
Payer: COMMERCIAL

## 2019-01-02 VITALS
HEIGHT: 72 IN | WEIGHT: 169 LBS | OXYGEN SATURATION: 94 % | DIASTOLIC BLOOD PRESSURE: 72 MMHG | TEMPERATURE: 98.4 F | SYSTOLIC BLOOD PRESSURE: 150 MMHG | BODY MASS INDEX: 22.89 KG/M2 | HEART RATE: 74 BPM

## 2019-01-02 DIAGNOSIS — E03.4 HYPOTHYROIDISM DUE TO ACQUIRED ATROPHY OF THYROID: ICD-10-CM

## 2019-01-02 DIAGNOSIS — M25.562 ACUTE PAIN OF BOTH KNEES: ICD-10-CM

## 2019-01-02 DIAGNOSIS — M25.561 ACUTE PAIN OF BOTH KNEES: ICD-10-CM

## 2019-01-02 DIAGNOSIS — M25.562 PAIN IN BOTH KNEES, UNSPECIFIED CHRONICITY: ICD-10-CM

## 2019-01-02 DIAGNOSIS — I10 ESSENTIAL HYPERTENSION: ICD-10-CM

## 2019-01-02 DIAGNOSIS — M25.561 PAIN IN BOTH KNEES, UNSPECIFIED CHRONICITY: ICD-10-CM

## 2019-01-02 PROCEDURE — 99214 OFFICE O/P EST MOD 30 MIN: CPT | Performed by: FAMILY MEDICINE

## 2019-01-02 RX ORDER — ETODOLAC 400 MG/1
400 TABLET, FILM COATED ORAL 2 TIMES DAILY
Qty: 60 TAB | Refills: 3 | Status: SHIPPED | OUTPATIENT
Start: 2019-01-02 | End: 2019-01-24

## 2019-01-02 RX ORDER — LISINOPRIL AND HYDROCHLOROTHIAZIDE 25; 20 MG/1; MG/1
0.5 TABLET ORAL DAILY
Qty: 60 TAB | Refills: 3 | Status: SHIPPED | OUTPATIENT
Start: 2019-01-02 | End: 2019-05-06

## 2019-01-02 ASSESSMENT — PATIENT HEALTH QUESTIONNAIRE - PHQ9
5. POOR APPETITE OR OVEREATING: 0 - NOT AT ALL
SUM OF ALL RESPONSES TO PHQ QUESTIONS 1-9: 12
CLINICAL INTERPRETATION OF PHQ2 SCORE: 5

## 2019-01-02 NOTE — PROGRESS NOTES
"Patient comes in with several issues.  He is developed worsening pain in both of his knees.  He had surgery through the [facility] on the left knee in September 2018 but then his insurance changed so he needs to be referred to a different orthopedic group.  He is worried that there is \"bone-on-bone\" in his knees.  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  His thyroid problems are stable.  He has a family history of hypertension in his mother.  The patient has been monitoring his blood pressure and is been getting 150-160/90-95.  He has no chest pains.    I reviewed the following    Past Medical History:   Diagnosis Date   • Herpetic gingivostomatitis    • Hypertension    • Inguinal hernia    • Substance abuse (HCC)    • Thyroid disease         Past Surgical History:   Procedure Laterality Date   • KNEE ARTHROSCOPY Left 9/5/2018    Procedure: KNEE ARTHROSCOPY;  Surgeon: Shaun Colbert M.D.;  Location: [facility];  Service: Orthopedics   • MEDIAL MENISCECTOMY Left 9/5/2018    Procedure: MEDIAL MENISCECTOMY - PARTIAL and partial lateral menisectomy;  Surgeon: Shaun Colbert M.D.;  Location: [facility];  Service: Orthopedics   • CHONDROPLASTY Left 9/5/2018    Procedure: CHONDROPLASTY - AND PHILLIP;  Surgeon: Shaun Colbert M.D.;  Location: [facility];  Service: Orthopedics   • KNEE ARTHROSCOPY Right 11/2015    Dr Colbert  Meniscus Repair and Clean Out   • HERNIA REPAIR Right 10/2009    right sided   • BIOPSY GENERAL Right 04/1985    from cat bite, enlarged lymph node   • OTHER SURGICAL PROCEDURE Bilateral 1973    bilateral ear \"pinned back\"       No Known Allergies    Current Outpatient Prescriptions   Medication Sig Dispense Refill   • Acetaminophen (TYLENOL 8 HOUR PO) Take  by mouth.     • etodolac (LODINE) 400 MG tablet Take 1 Tab by mouth 2 times a day. Take with food 60 Tab 3   • lisinopril-hydrochlorothiazide (PRINZIDE, ZESTORETIC) 20-25 MG per tablet Take 0.5 " Tabs by mouth every day. 60 Tab 3   • ibuprofen (MOTRIN) 200 MG Tab Take 200 mg by mouth every 6 hours as needed.     • Cholecalciferol (VITAMIN D3) 2000 UNIT Cap Take  by mouth every day.     • levothyroxine (SYNTHROID) 100 MCG Tab TAKE 1 TABLET BY MOUTH MONDAY THROUGH SATURDAY. SKIP SUNDAYS 100 Tab 3   • promethazine (PHENERGAN) 25 MG Tab Take 1 Tab by mouth every 6 hours as needed. (Patient not taking: Reported on 1/2/2019) 10 Tab 0     No current facility-administered medications for this visit.         Family History   Problem Relation Age of Onset   • Lung Disease Mother         Pulmonary fibrosis   • Hypertension Mother        Social History     Social History   • Marital status: Single     Spouse name: N/A   • Number of children: N/A   • Years of education: N/A     Occupational History   • Not on file.     Social History Main Topics   • Smoking status: Former Smoker     Packs/day: 1.00     Years: 30.00     Quit date: 3/28/2013   • Smokeless tobacco: Never Used      Comment: still smoke on rare occasions    • Alcohol use Yes      Comment: 2-3 per day   • Drug use: No   • Sexual activity: Yes     Partners: Female     Other Topics Concern   • Not on file     Social History Narrative   • No narrative on file      Physical Exam   Constitutional: He is oriented. He appears well-developed and well-nourished. No distress.   HENT:   Head: Normocephalic and atraumatic.   Right Ear: External ear normal. Ear canal and TM normal   Left Ear: External ear normal. Ear canal and TM normal   Nose: Nose normal.   Mouth/Throat: Oropharynx is clear and moist.   Eyes: Conjunctivae and extraocular motions are normal. Pupils are equal, round, and reactive to light.        Fundi benign bilaterally   Neck: No thyromegaly present.   Cardiovascular: Normal rate, regular rhythm, normal heart sounds and intact distal pulses.  Exam reveals no gallop.    No murmur heard.  Pulmonary/Chest: Effort normal and breath sounds normal. No  respiratory distress. He has no wheezes. He has no rales.   Abdominal: Soft. Bowel sounds are normal. No hepatosplenomegaly. He exhibits no distension. No tenderness. He has no rebound and no guarding.   Musculoskeletal: Normal range of motion. He exhibits no edema he has bilateral crepitus in his knees.  Negative anterior and posterior drawer signs.  Katlyn's tests are equivocal on the left and negative on the right..   Lymphadenopathy:     He has no cervical adenopathy.  No supraclavicular adenopathy.   Neurological: He is alert and oriented. He has normal reflexes.        Babinskis downgoing bilaterally   Skin: Skin is warm and dry. No rash noted. No erythema.   Psychiatric: He has a normal mood and appropriate affect. His behavior is normal. Judgment and thought content normal.     1. Pain in both knees, unspecified chronicity  etodolac (LODINE) 400 MG tablet--1 p.o. twice daily with food    REFERRAL TO ORTHOPEDICS--- Dr. Saldana or Dr. Bobby at [facility]   2. Acute pain of both knees  etodolac (LODINE) 400 MG tablet    REFERRAL TO ORTHOPEDICS--Dr. Saldana or Dr. Bobby   3. Hypothyroidism due to acquired atrophy of thyroid   doing well   4. Essential hypertension  lisinopril-hydrochlorothiazide (PRINZIDE, ZESTORETIC) 20-25 MG per tablet--begin 1/2 tablet p.o. daily.     Please note that this dictation was created using voice recognition software. I have worked with consultants from the vendor as well as technical experts from [facility] to optimize the interface. I have made every reasonable attempt to correct obvious errors, but I expect that there are errors of grammar and possibly content that I did not discover before finalizing the note.    Recheck blood pressure etc. in 6 weeks.

## 2019-01-02 NOTE — PATIENT INSTRUCTIONS
Patient given written instructions regarding labs, imaging, medications, referrals, dietary and lifestyle management, and return visit.    Modesto Navas MD

## 2019-01-24 ENCOUNTER — OFFICE VISIT (OUTPATIENT)
Dept: URGENT CARE | Facility: PHYSICIAN GROUP | Age: 49
End: 2019-01-24
Payer: COMMERCIAL

## 2019-01-24 ENCOUNTER — HOSPITAL ENCOUNTER (OUTPATIENT)
Dept: RADIOLOGY | Facility: MEDICAL CENTER | Age: 49
End: 2019-01-24
Attending: FAMILY MEDICINE
Payer: COMMERCIAL

## 2019-01-24 VITALS
HEART RATE: 86 BPM | TEMPERATURE: 99 F | SYSTOLIC BLOOD PRESSURE: 128 MMHG | DIASTOLIC BLOOD PRESSURE: 86 MMHG | BODY MASS INDEX: 22.89 KG/M2 | WEIGHT: 169 LBS | HEIGHT: 72 IN | RESPIRATION RATE: 16 BRPM | OXYGEN SATURATION: 97 %

## 2019-01-24 DIAGNOSIS — R76.8 ELEVATED ANTINUCLEAR ANTIBODY (ANA) LEVEL: ICD-10-CM

## 2019-01-24 DIAGNOSIS — M54.50 ACUTE MIDLINE LOW BACK PAIN WITHOUT SCIATICA: ICD-10-CM

## 2019-01-24 PROCEDURE — 72100 X-RAY EXAM L-S SPINE 2/3 VWS: CPT

## 2019-01-24 PROCEDURE — 99214 OFFICE O/P EST MOD 30 MIN: CPT | Performed by: FAMILY MEDICINE

## 2019-01-24 RX ORDER — CELECOXIB 200 MG/1
200 CAPSULE ORAL 2 TIMES DAILY
Qty: 60 CAP | Refills: 0 | Status: SHIPPED | OUTPATIENT
Start: 2019-01-24 | End: 2019-03-13

## 2019-01-24 ASSESSMENT — PAIN SCALES - GENERAL: PAINLEVEL: 5=MODERATE PAIN

## 2019-01-24 ASSESSMENT — ENCOUNTER SYMPTOMS
TINGLING: 0
WEAKNESS: 0
BOWEL INCONTINENCE: 0
NUMBNESS: 0
PERIANAL NUMBNESS: 0
LEG PAIN: 1
BACK PAIN: 1
PARESIS: 0
PARESTHESIAS: 0

## 2019-01-24 NOTE — PROGRESS NOTES
Subjective:   Michele Griffin is a 48 y.o. male who presents for Back Pain (x 3 months// inflammation// back, knee, elbow// numbness on and off// )        Back Pain   This is a new problem. The current episode started more than 1 month ago. The problem occurs constantly. The problem has been rapidly worsening since onset. The pain is present in the lumbar spine. The quality of the pain is described as aching, burning and cramping. The pain is moderate. Associated symptoms include leg pain. Pertinent negatives include no bladder incontinence, bowel incontinence, numbness, paresis, paresthesias, perianal numbness, tingling or weakness.     Review of Systems   Gastrointestinal: Negative for bowel incontinence.   Genitourinary: Negative for bladder incontinence.   Musculoskeletal: Positive for back pain.   Neurological: Negative for tingling, weakness, numbness and paresthesias.     No Known Allergies   Objective:   /86   Pulse 86   Temp 37.2 °C (99 °F) (Temporal)   Resp 16   Ht 1.829 m (6')   Wt 76.7 kg (169 lb)   SpO2 97%   BMI 22.92 kg/m²   Physical Exam   Constitutional: He is oriented to person, place, and time. He appears well-developed and well-nourished. No distress.   HENT:   Head: Normocephalic and atraumatic.   Eyes: Pupils are equal, round, and reactive to light. Conjunctivae and EOM are normal.   Cardiovascular: Normal rate, regular rhythm and intact distal pulses.    No murmur heard.  Pulmonary/Chest: Effort normal and breath sounds normal. No respiratory distress.   Abdominal: Soft. He exhibits no distension. There is no tenderness.   Musculoskeletal:        Lumbar back: He exhibits decreased range of motion, tenderness and spasm. He exhibits no bony tenderness.   Neurological: He is alert and oriented to person, place, and time. He has normal reflexes. No sensory deficit.   Skin: Skin is warm and dry.   Psychiatric: He has a normal mood and affect. His behavior is normal.   Vitals  reviewed.        Assessment/Plan:   1. Elevated antinuclear antibody (RIGO) level  - REFERRAL TO RHEUMATOLOGY  - DX-LUMBAR SPINE-2 OR 3 VIEWS; Future  - celecoxib (CELEBREX) 200 MG Cap; Take 1 Cap by mouth 2 times a day.  Dispense: 60 Cap; Refill: 0    2. Acute midline low back pain without sciatica  - celecoxib (CELEBREX) 200 MG Cap; Take 1 Cap by mouth 2 times a day.  Dispense: 60 Cap; Refill: 0    Differential diagnosis, natural history, supportive care, and indications for immediate follow-up discussed.

## 2019-01-31 ENCOUNTER — OFFICE VISIT (OUTPATIENT)
Dept: URGENT CARE | Facility: PHYSICIAN GROUP | Age: 49
End: 2019-01-31
Payer: COMMERCIAL

## 2019-01-31 VITALS
HEART RATE: 84 BPM | WEIGHT: 164 LBS | OXYGEN SATURATION: 95 % | RESPIRATION RATE: 13 BRPM | BODY MASS INDEX: 22.21 KG/M2 | SYSTOLIC BLOOD PRESSURE: 144 MMHG | TEMPERATURE: 99.8 F | HEIGHT: 72 IN | DIASTOLIC BLOOD PRESSURE: 94 MMHG

## 2019-01-31 DIAGNOSIS — M25.50 POLYARTHRALGIA: ICD-10-CM

## 2019-01-31 PROCEDURE — 99214 OFFICE O/P EST MOD 30 MIN: CPT | Performed by: FAMILY MEDICINE

## 2019-01-31 RX ORDER — MULTIVIT WITH MINERALS/LUTEIN
TABLET ORAL
COMMUNITY
End: 2020-04-27

## 2019-01-31 RX ORDER — PREDNISONE 10 MG/1
TABLET ORAL
Qty: 75 TAB | Refills: 0 | Status: SHIPPED | OUTPATIENT
Start: 2019-01-31 | End: 2019-03-13

## 2019-01-31 RX ORDER — FAMOTIDINE 20 MG/1
20 TABLET, FILM COATED ORAL 2 TIMES DAILY
Qty: 60 TAB | Refills: 0 | Status: SHIPPED | OUTPATIENT
Start: 2019-01-31 | End: 2019-03-13

## 2019-01-31 RX ORDER — ESOMEPRAZOLE MAGNESIUM 40 MG/1
40 CAPSULE, DELAYED RELEASE ORAL
Qty: 30 CAP | Refills: 0 | Status: SHIPPED | OUTPATIENT
Start: 2019-01-31 | End: 2019-03-13

## 2019-01-31 RX ORDER — SUCRALFATE 1 G/1
1 TABLET ORAL
Qty: 120 TAB | Refills: 0 | Status: SHIPPED | OUTPATIENT
Start: 2019-01-31 | End: 2019-03-13

## 2019-01-31 ASSESSMENT — ENCOUNTER SYMPTOMS
BACK PAIN: 1
PARESIS: 0
TINGLING: 0
PERIANAL NUMBNESS: 0
NUMBNESS: 0
BOWEL INCONTINENCE: 0

## 2019-02-01 NOTE — PROGRESS NOTES
Subjective:   Michele Griffin is a 48 y.o. male who presents for Back Pain (Bilateral knee pain. Ongoing. Unable to get into Rheumatology. Here to follow up.)        Back Pain   This is a new problem. The current episode started more than 1 month ago. The problem occurs constantly. The problem has been rapidly worsening since onset. The pain is present in the lumbar spine and thoracic spine. The quality of the pain is described as aching and burning. The pain does not radiate. The pain is moderate. The symptoms are aggravated by bending and coughing. Pertinent negatives include no bladder incontinence, bowel incontinence, numbness, paresis, perianal numbness or tingling.     Review of Systems   Gastrointestinal: Negative for bowel incontinence.   Genitourinary: Negative for bladder incontinence.   Musculoskeletal: Positive for back pain.   Neurological: Negative for tingling and numbness.     No Known Allergies   Objective:   /94   Pulse 84   Temp 37.7 °C (99.8 °F)   Resp 13   Ht 1.829 m (6')   Wt 74.4 kg (164 lb)   SpO2 95%   BMI 22.24 kg/m²   Physical Exam   Constitutional: He is oriented to person, place, and time. He appears well-developed and well-nourished. No distress.   HENT:   Head: Normocephalic and atraumatic.   Eyes: Pupils are equal, round, and reactive to light. Conjunctivae and EOM are normal.   Cardiovascular: Normal rate, regular rhythm and intact distal pulses.    No murmur heard.  Pulmonary/Chest: Effort normal and breath sounds normal. No respiratory distress.   Abdominal: Soft. He exhibits no distension. There is no tenderness.   Musculoskeletal:        Lumbar back: He exhibits decreased range of motion, tenderness and spasm. He exhibits no bony tenderness.   Neurological: He is alert and oriented to person, place, and time. He has normal reflexes. No sensory deficit.   Skin: Skin is warm and dry.   Psychiatric: He has a normal mood and affect. His behavior is normal.    Vitals reviewed.        Assessment/Plan:   1. Polyarthralgia  - predniSONE (DELTASONE) 10 MG Tab; Take 6 tabs for 5 days. Then 4 tabs for 5 days then 2 tabs for 5 days then 1 tab for 5 days.  Dispense: 75 Tab; Refill: 0  - sucralfate (CARAFATE) 1 GM Tab; Take 1 Tab by mouth 4 Times a Day,Before Meals and at Bedtime.  Dispense: 120 Tab; Refill: 0  - famotidine (PEPCID) 20 MG Tab; Take 1 Tab by mouth 2 times a day.  Dispense: 60 Tab; Refill: 0  - esomeprazole (NEXIUM) 40 MG delayed-release capsule; Take 1 Cap by mouth every morning before breakfast.  Dispense: 30 Cap; Refill: 0  - REFERRAL TO SPINE SURGERY    Other orders  - Ascorbic Acid (VITAMIN C) 1000 MG Tab; Take  by mouth.    Differential diagnosis, natural history, supportive care, and indications for immediate follow-up discussed.

## 2019-03-03 PROBLEM — Z15.89 HLA B27 (HLA B27 POSITIVE): Status: ACTIVE | Noted: 2019-03-03

## 2019-03-13 ENCOUNTER — OFFICE VISIT (OUTPATIENT)
Dept: MEDICAL GROUP | Facility: PHYSICIAN GROUP | Age: 49
End: 2019-03-13
Payer: OTHER GOVERNMENT

## 2019-03-13 VITALS
DIASTOLIC BLOOD PRESSURE: 74 MMHG | TEMPERATURE: 99.7 F | HEART RATE: 89 BPM | WEIGHT: 166 LBS | BODY MASS INDEX: 22.48 KG/M2 | RESPIRATION RATE: 12 BRPM | OXYGEN SATURATION: 95 % | SYSTOLIC BLOOD PRESSURE: 142 MMHG | HEIGHT: 72 IN

## 2019-03-13 DIAGNOSIS — Z15.89 HLA B27 (HLA B27 POSITIVE): ICD-10-CM

## 2019-03-13 DIAGNOSIS — E03.4 HYPOTHYROIDISM DUE TO ACQUIRED ATROPHY OF THYROID: ICD-10-CM

## 2019-03-13 DIAGNOSIS — R79.89 LOW VITAMIN D LEVEL: ICD-10-CM

## 2019-03-13 DIAGNOSIS — I10 ESSENTIAL HYPERTENSION: ICD-10-CM
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
## 2019-03-13 PROBLEM — S83.232A COMPLEX TEAR OF MEDIAL MENISCUS OF LEFT KNEE, INITIAL ENCOUNTER: Status: RESOLVED | Noted: 2018-09-05 | Resolved: 2019-03-13

## 2019-03-13 PROCEDURE — 99214 OFFICE O/P EST MOD 30 MIN: CPT | Performed by: FAMILY MEDICINE

## 2019-03-13 RX ORDER — AMOXICILLIN 500 MG/1
500 CAPSULE ORAL 4 TIMES DAILY
COMMUNITY
End: 2019-04-04

## 2019-03-13 NOTE — PROGRESS NOTES
CC: Hypertension    HISTORY OF THE PRESENT ILLNESS: Patient is a 48 y.o. male. This pleasant patient is here today to establish care.    He was previously followed by Dr. Navas.      1.  Low vitamin D level  Is a chronic medical problem for which he is currently taking almost 10,000 units of vitamin D daily.  He had a fall back in 2017 when he fell off a ladder.  He does work as a .  His most recent vitamin D level was 25 back in August 2018.    2.  Hypothyroid  This is a chronic medical problem for which he is currently taking levothyroxine 100 mcg daily.  His most recent TSH from August 2018 was 1.19 and free T4 1.0.    3.  Hypertension  This is a chronic medical problem for which she is currently taking half a tablet of Zestoretic 20-25 mg daily.  Denies any headaches or chest pain.    4.  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  He was seen in the urgent care end of January 2019 and at that time he was referred to spine surgery and rheumatology.  They recommended some labs prior to his appointment.  He mentions that his pain in the back and the knees has resolved since then.    Allergies: Patient has no known allergies.    Current Outpatient Prescriptions Ordered in [facility]   Medication Sig Dispense Refill   • amoxicillin (AMOXIL) 500 MG Cap Take 500 mg by mouth 4 times a day.     • Ascorbic Acid (VITAMIN C) 1000 MG Tab Take  by mouth.     • lisinopril-hydrochlorothiazide (PRINZIDE, ZESTORETIC) 20-25 MG per tablet Take 0.5 Tabs by mouth every day. 60 Tab 3   • Cholecalciferol (VITAMIN D3) 2000 UNIT Cap Take 10,000 Units by mouth every day.     • levothyroxine (SYNTHROID) 100 MCG Tab TAKE 1 TABLET BY MOUTH MONDAY THROUGH SATURDAY. SKIP SUNDAYS 100 Tab 3     No current Epic-ordered facility-administered medications on file.          ROS:  See above and         - Constitutional: Negative for fever, chills, and fatigue   - Eyes:  Negative blurry vision or eye pain    - ENT: Negative for ear  pain, rhinorrhea, sinus congestion, sore throat    - Respiratory: Negative for cough or shortness of breath    - Cardiovascular: Negative for chest pain, palpitations    - Gastrointestinal: Negative for heartburn, nausea, vomiting, abdominal pain,     - Genitourinary: Negative for dysuria    - Musculoskeletal: Negative for myalgias    - Skin: Negative for rash, itching    - Neurological: Negative for headaches, dizziness    - Endo:  Negative for increased thirst or polyuria    - Heme/Lymph: Does not bruise/bleed easily.     - Psychiatric/Behavioral: Negative for depression and anxiety   .  Exam: /74 (BP Location: Left arm, Patient Position: Sitting, BP Cuff Size: Adult)   Pulse 89   Temp 37.6 °C (99.7 °F) (Temporal)   Resp 12   Ht 1.829 m (6')   Wt 75.3 kg (166 lb)   SpO2 95%   BMI 22.51 kg/m²     General:  Normal appearing. No distress.  Eyes:  Eyes conjunctiva clear lids without ptosis, pupils equal and reactive to light accommodation,   ENMT: Bilateral cerumen ears normal shape and contour,  nasal mucosa benign, oropharynx is without erythema, edema or exudates.   Neck:  Supple without JVD or bruit. Thyroid is not enlarged.  Pulmonary:  Clear to ausculation.  Normal effort. No rales, ronchi, or wheezing.  Cardiovascular:  Regular rate and rhythm without murmur.  Abdomen:  Soft, nontender, nondistended. Normal bowel sounds.  Neurologic:  No tremors  Lymph:  No cervical, supraclavicular  lymph nodes are palpable  Skin:  Warm and dry.  No obvious lesions.  Musculoskeletal:  Normal gait. No extremity cyanosis, clubbing, or edema.  Psych:   Normal mood and affect. Alert and oriented x3. Judgment and insight is normal.    Please note that this dictation was created using voice recognition software. I have made every reasonable attempt to correct obvious errors, but I expect that there are errors of grammar and possibly content that I did not discover before finalizing the  note.      Assessment/Plan  Michele was seen today for establish care.    Diagnoses and all orders for this visit:    Low vitamin D level  Chronic medical condition.  Discussed with him that he is taking high-dose vitamin D and we will check labs.-     VITAMIN D,25 HYDROXY; Future    Hypothyroidism due to acquired atrophy of thyroid  Chronic medical condition.  Continue with Synthroid.  Stable.  Check labs-     TSH WITH REFLEX TO FT4; Future    Essential hypertension  Chronic medical condition.  Stable with medications.-     Comp Metabolic Panel; Future  -     Lipid Profile; Future    HLA B27 (HLA B27 positive)  Chronic medical diagnosis.  Labs ordered.-     RIGO IGG LEELA W/RFLX TO RIGO IGG IFA; Future  -     ANTI-DNA (DS); Future  -     SMITH AB IGG; Future  -     COMPLEMENT C3; Future  -     COMPLEMENT C4; Future  -     COMPLEMENT TOTAL (CH50); Future  -     THYROID PEROXIDASE  (TPO) AB; Future  -     Anti-Neutrophil Cytoplasmic Antibodies Screen; Future  -     ANTITHYROGLOBULIN AB; Future      Follow-up with me in 6 months.    No Follow-up on file.

## 2019-03-20 ENCOUNTER — OFFICE VISIT (OUTPATIENT)
Dept: URGENT CARE | Facility: PHYSICIAN GROUP | Age: 49
End: 2019-03-20
Payer: OTHER GOVERNMENT

## 2019-03-20 VITALS
SYSTOLIC BLOOD PRESSURE: 122 MMHG | TEMPERATURE: 99.6 F | OXYGEN SATURATION: 96 % | WEIGHT: 166 LBS | HEIGHT: 72 IN | HEART RATE: 66 BPM | RESPIRATION RATE: 16 BRPM | BODY MASS INDEX: 22.48 KG/M2 | DIASTOLIC BLOOD PRESSURE: 90 MMHG

## 2019-03-20 DIAGNOSIS — M25.50 POLYARTHRALGIA: ICD-10-CM

## 2019-03-20 PROCEDURE — 99214 OFFICE O/P EST MOD 30 MIN: CPT | Performed by: FAMILY MEDICINE

## 2019-03-20 RX ORDER — PREDNISONE 10 MG/1
TABLET ORAL
Qty: 75 TAB | Refills: 0 | Status: SHIPPED | OUTPATIENT
Start: 2019-03-20 | End: 2019-04-04

## 2019-03-20 RX ORDER — CELECOXIB 100 MG/1
100 CAPSULE ORAL 2 TIMES DAILY
Qty: 60 CAP | Refills: 0 | Status: SHIPPED | OUTPATIENT
Start: 2019-03-20 | End: 2019-04-19 | Stop reason: SDUPTHER

## 2019-03-21 ASSESSMENT — ENCOUNTER SYMPTOMS
NAUSEA: 0
VOMITING: 0
JOINT SWELLING: 0
FEVER: 0
CHILLS: 0

## 2019-03-21 NOTE — PROGRESS NOTES
Subjective:   Michele Griffin is a 48 y.o. male who presents for Knee Swelling (onset months// both knees// knots hip and back// cramps )        Knee Pain   This is a recurrent problem. The current episode started in the past 7 days. The problem occurs constantly. The problem has been rapidly worsening. Pertinent negatives include no chills, fever, joint swelling, nausea or vomiting.     Review of Systems   Constitutional: Negative for chills and fever.   Gastrointestinal: Negative for nausea and vomiting.   Musculoskeletal: Negative for joint swelling.     No Known Allergies   Objective:   /90   Pulse 66   Temp 37.6 °C (99.6 °F) (Temporal)   Resp 16   Ht 1.829 m (6')   Wt 75.3 kg (166 lb)   SpO2 96%   BMI 22.51 kg/m²   Physical Exam   Constitutional: He is oriented to person, place, and time. He appears well-developed and well-nourished. No distress.   HENT:   Head: Normocephalic and atraumatic.   Eyes: Pupils are equal, round, and reactive to light. Conjunctivae and EOM are normal.   Cardiovascular: Normal rate and regular rhythm.    No murmur heard.  Pulmonary/Chest: Effort normal and breath sounds normal. No respiratory distress.   Abdominal: Soft. He exhibits no distension. There is no tenderness.   Musculoskeletal:        Right knee: He exhibits normal range of motion, no swelling and no effusion. Tenderness found.        Left knee: He exhibits normal range of motion, no swelling and no effusion. Tenderness found.   Neurological: He is alert and oriented to person, place, and time. He has normal reflexes. No sensory deficit.   Skin: Skin is warm and dry.   Psychiatric: He has a normal mood and affect.         Assessment/Plan:   1. Polyarthralgia  - predniSONE (DELTASONE) 10 MG Tab; Take 6 tabs for 5 days. Then 4 tabs for 5 days then 2 tabs for 5 days then 1 tab for 5 days.  Dispense: 75 Tab; Refill: 0  - celecoxib (CELEBREX) 100 MG Cap; Take 1 Cap by mouth 2 times a day.  Dispense: 60 Cap;  Refill: 0    Differential diagnosis, natural history, supportive care, and indications for immediate follow-up discussed.

## 2019-04-03 DIAGNOSIS — Z15.89 HLA B27 POSITIVE: ICD-10-CM

## 2019-04-03 NOTE — PROGRESS NOTES
Received a message from rheumatology coordinator.  Patient needs assess day and SSB drawn prior to referral being processed.  Lab orders placed.    Billie Franklin M.D.

## 2019-04-04 ENCOUNTER — OFFICE VISIT (OUTPATIENT)
Dept: MEDICAL GROUP | Facility: PHYSICIAN GROUP | Age: 49
End: 2019-04-04
Payer: OTHER GOVERNMENT

## 2019-04-04 VITALS
RESPIRATION RATE: 12 BRPM | HEIGHT: 72 IN | OXYGEN SATURATION: 97 % | WEIGHT: 165 LBS | DIASTOLIC BLOOD PRESSURE: 70 MMHG | TEMPERATURE: 99.4 F | BODY MASS INDEX: 22.35 KG/M2 | HEART RATE: 79 BPM | SYSTOLIC BLOOD PRESSURE: 104 MMHG

## 2019-04-04 DIAGNOSIS — I10 ESSENTIAL HYPERTENSION: ICD-10-CM

## 2019-04-04 DIAGNOSIS — E78.2 MIXED HYPERLIPIDEMIA: ICD-10-CM

## 2019-04-04 DIAGNOSIS — M25.562 ARTHRALGIA OF BOTH KNEES: ICD-10-CM

## 2019-04-04 DIAGNOSIS — M25.561 ARTHRALGIA OF BOTH KNEES: ICD-10-CM

## 2019-04-04 DIAGNOSIS — Z12.5 SCREENING FOR MALIGNANT NEOPLASM OF PROSTATE: ICD-10-CM

## 2019-04-04 PROCEDURE — 99214 OFFICE O/P EST MOD 30 MIN: CPT | Performed by: FAMILY MEDICINE

## 2019-04-04 RX ORDER — VALACYCLOVIR HYDROCHLORIDE 500 MG/1
TABLET, FILM COATED ORAL
Refills: 3 | COMMUNITY
Start: 2019-01-11

## 2019-04-05 NOTE — PROGRESS NOTES
cc: Chronic joint pain    Subjective:     Michele Griffin is a 48 y.o. male presenting for follow-up of his chronic joint pains    1. Arthralgias  This is a chronic medical problem which is been going on for several months now.  He was seen in urgent care in January and then again in March.  He was given a referral to rheumatology and he is seeing Dr. Gustafson in the interim.  He was told by Dr. Gustafson that his symptoms are not on rheumatological.  However [facility] rheumatology is requesting further labs before processing his referral.  He was also seen by Jody GEE at the [facility], Dr. Colbert, who did some imaging of his knees and mentioned that it there is no source for his chronic knee discomfort. he continues to complain of bilateral knee pain on a daily basis.  He is able to work as a  and doing yoga he does mention that his pain is at a level of 2 out of 10.  He complains of a constant ache but no weakness.  It does improve with Celebrex but was much better with ibuprofen.  However he discontinued that due to stomach issues.  His pain does rotate and occasionally he will have hip pain as well as SI joint pain.  He is changed his diet to gluten-free and has noticed some improvement.  Denies any fever chills, heartburn.  He has noticed that his bowel movements are more frequent over the last few weeks.      2 hypertension  This is a chronic medical problem which is currently well controlled.  Blood pressure on my recheck is 118/84.  He denies any chest pain, headaches or palpitations.  Denies any lower extremity swelling.    3.  Mixed hyperlipidemia  This is a chronic medical problem for which he is currently not on any medications.  His most recent labs from last month show total cholesterol 213, HDL 57, triglycerides 73, and .  He does mention that he is adopted and he does not know his family history.  He continues to be very active and is little frustrated as to why he has high cholesterol and high blood  pressure along with his knee pain.      Review of systems:  See above and   No Known Allergies      Current Outpatient Prescriptions:   •  celecoxib (CELEBREX) 100 MG Cap, Take 1 Cap by mouth 2 times a day., Disp: 60 Cap, Rfl: 0  •  Ascorbic Acid (VITAMIN C) 1000 MG Tab, Take  by mouth., Disp: , Rfl:   •  lisinopril-hydrochlorothiazide (PRINZIDE, ZESTORETIC) 20-25 MG per tablet, Take 0.5 Tabs by mouth every day., Disp: 60 Tab, Rfl: 3  •  Cholecalciferol (VITAMIN D3) 2000 UNIT Cap, Take 3,000 Units by mouth every day., Disp: , Rfl:   •  levothyroxine (SYNTHROID) 100 MCG Tab, TAKE 1 TABLET BY MOUTH MONDAY THROUGH SATURDAY. SKIP SUNDAYS, Disp: 100 Tab, Rfl: 3  •  valACYclovir (VALTREX) 500 MG Tab, TK 1 T PO  BID TAT, Disp: , Rfl: 3    Allergies, past medical history, past surgical history, family history, social history reviewed and updated    Objective:     Vitals: /70 (BP Location: Left arm, Patient Position: Sitting, BP Cuff Size: Adult)   Pulse 79   Temp 37.4 °C (99.4 °F) (Temporal)   Resp 12   Ht 1.829 m (6')   Wt 74.8 kg (165 lb)   SpO2 97%   BMI 22.38 kg/m²   General:  Alert, pleasant, NAD  Eyes:  normal inspection of conjunctivae and lids, EOMI,   ENMT:  External ears and nose are normal.    Neck  supple,   Heart:  Regular rate and rhythm,  No LE edema  Respiratory:  Normal respiratory effort, Clear to auscultation bilaterally.  Abdomen:   soft, Non-distended,   Skin:  Warm, dry, no rashes,   Musculoskeletal:  Normal gait, Normal digits and nails.  Neurological: No tremors,   Psych:   Affect/mood is normal, judgement is good, memory is intact, grooming is appropriate.    Assessment/Plan:     Michele was seen today for edema.    Diagnoses and all orders for this visit:    Essential hypertension  Chronic medical condition.  Stable with Zestoretic 20-25 mg daily.  He will continue to keep a blood pressure log at home as he feels that his blood pressure readings at home are higher than today in the  office.      Screening for malignant neoplasm of prostate  -     PROSTATE SPECIFIC AG SCREENING; Future    Mixed hyperlipidemia  Chronic medical condition.  Not well controlled.  Wants to try lifestyle changes first.    Arthralgia of both knees  Chronic medical condition.  Some improvement with Celebrex.  Discussed with him to get his SSA and SSB labs drawn.  Possible referral to [facility], [facility], or different orthopedic group discussed with him.  Wants to hold off on repeating x-rays currently.  -     T-TG IGG; Future          Return in about 4 weeks (around 5/2/2019), or 3-4 wks.

## 2019-05-06 ENCOUNTER — OFFICE VISIT (OUTPATIENT)
Dept: MEDICAL GROUP | Facility: PHYSICIAN GROUP | Age: 49
End: 2019-05-06
Payer: OTHER GOVERNMENT

## 2019-05-06 VITALS
SYSTOLIC BLOOD PRESSURE: 100 MMHG | WEIGHT: 153 LBS | HEIGHT: 72 IN | DIASTOLIC BLOOD PRESSURE: 68 MMHG | HEART RATE: 75 BPM | OXYGEN SATURATION: 96 % | BODY MASS INDEX: 20.72 KG/M2 | TEMPERATURE: 98.9 F

## 2019-05-06 DIAGNOSIS — M25.50 POLYARTHRALGIA: ICD-10-CM

## 2019-05-06 DIAGNOSIS — E55.9 VITAMIN D DEFICIENCY DISEASE: ICD-10-CM

## 2019-05-06 DIAGNOSIS — E78.2 MIXED HYPERLIPIDEMIA: ICD-10-CM

## 2019-05-06 DIAGNOSIS — I10 ESSENTIAL HYPERTENSION: ICD-10-CM

## 2019-05-06 PROBLEM — Z92.89: Status: ACTIVE | Noted: 2017-11-06

## 2019-05-06 PROCEDURE — 99214 OFFICE O/P EST MOD 30 MIN: CPT | Performed by: FAMILY MEDICINE

## 2019-05-06 RX ORDER — LISINOPRIL 10 MG/1
10 TABLET ORAL DAILY
Qty: 30 TAB | Refills: 3 | Status: SHIPPED | OUTPATIENT
Start: 2019-05-06 | End: 2019-09-16 | Stop reason: SDUPTHER

## 2019-05-06 RX ORDER — DULOXETIN HYDROCHLORIDE 30 MG/1
60 CAPSULE, DELAYED RELEASE ORAL DAILY
COMMUNITY
End: 2019-08-21

## 2019-05-06 NOTE — PROGRESS NOTES
"cc: Back pain    Subjective:     Michele Griffin is a 48 y.o. male presenting to discuss his chronic aches and pains.    1. Essential hypertension  Chronic medical diagnosis for which she has been taking Zestoretic 20-25 mg daily.  He mentions that he has lost 35 pounds over the last 7 8 months by watching his diet.. Blood pressure today is 100/68.  Denies any dizziness, chest pain or headaches.    2. Vitamin D deficiency disease  This is a chronic medical issue which has improved.  He is currently taking vitamin d 5000 units daily.  Last level drawn in March was normal at 66.    3. Mixed hyperlipidemia  Chronic medical issue.  Most recent labs from march were elevated with cholesterol 213, hdl 57, ldl 139, triglycerides 73.  Currently not on meds and working on lifestyle modifications.      4. Polyarthralgia  Chronic medical problem.  This is been going on for the last 7 to 8 months.  He was seen by a rheumatologist, Dr. Gustafson, and was told that it is not rheumatological issue.  He is also been evaluated by orthopedic, Dr. Colbert, who doesn't think its an orthopedic issue.  Patient has become frustrated over the last few months does not have a clear diagnosis for his fluctuating pains.  He does mention that he did see a \"lady for his spine\" recently who gave him a prescription for Cymbalta.  He has tried a gluten-free diet over the last 2 weeks and has noticed much improvement in his aches and pains.  He was able to go camping and hiking with his family over the weekend.  Has an appointment coming up with GI in 2 days.  There have been lots of work and home life stressors recently.        Review of systems:  See above and negative for fevers, chills, nausea, emesis, sob, chest pain.   No Known Allergies      Current Outpatient Prescriptions:   •  lisinopril (PRINIVIL) 10 MG Tab, Take 1 Tab by mouth every day., Disp: 30 Tab, Rfl: 3  •  valACYclovir (VALTREX) 500 MG Tab, TK 1 T PO  BID TAT, Disp: , Rfl: 3  •  " Cholecalciferol (VITAMIN D3) 2000 UNIT Cap, Take 5,000 Units by mouth every day., Disp: , Rfl:   •  levothyroxine (SYNTHROID) 100 MCG Tab, TAKE 1 TABLET BY MOUTH MONDAY THROUGH SATURDAY. SKIP SUNDAYS, Disp: 100 Tab, Rfl: 3  •  DULoxetine (CYMBALTA) 30 MG Cap DR Particles, Take 30 mg by mouth every day., Disp: , Rfl:   •  celecoxib (CELEBREX) 100 MG Cap, Take 1 Cap by mouth 2 times a day. (Patient not taking: Reported on 5/6/2019), Disp: 60 Cap, Rfl: 0  •  Ascorbic Acid (VITAMIN C) 1000 MG Tab, Take  by mouth., Disp: , Rfl:     Allergies, past medical history, past surgical history, family history, social history reviewed and updated    Objective:     Vitals: /68 (BP Location: Right arm, Patient Position: Sitting, BP Cuff Size: Adult)   Pulse 75   Temp 37.2 °C (98.9 °F) (Temporal)   Ht 1.829 m (6')   Wt 69.4 kg (153 lb)   SpO2 96%   BMI 20.75 kg/m²   General:  Alert, pleasant, NAD  Eyes:  normal inspection of conjunctivae and lids, EOMI,   ENMT:  External ears and nose are normal.    Neck  supple,   Heart:  Regular rate and rhythm,  No LE edema  Respiratory:  Normal respiratory effort, Clear to auscultation bilaterally.  Abdomen:   soft, Non-distended,   Skin:  Warm, dry, no rashes,   Musculoskeletal:  Normal gait, Normal digits and nails.  Neurological: No tremors,   Psych:   Affect/mood is normal, judgement is good, memory is intact, grooming is appropriate.    Assessment/Plan:     Michele was seen today for hypertension, edema and back pain.    Diagnoses and all orders for this visit:    Essential hypertension  Medical diagnosis.  Improving.  Continue Zestoretic and start lisinopril at 10 mg.  Follow-up with me in 1 month.  -     lisinopril (PRINIVIL) 10 MG Tab; Take 1 Tab by mouth every day.  -     VITAMIN D,25 HYDROXY; Future    Vitamin D deficiency disease  Chronic medical problem.  Improved.  Patient seems to think that a lot of his aches and pains are coming from his vitamin D level.      Mixed  hyperlipidemia  Chronic medical diagnosis.  Not well controlled.  Continue to work on lifestyle modification.  We will recheck these labs in 3 months.  -     Lipid Profile; Future    Polyarthralgia  Chronic medical diagnosis.  Currently improving with gluten-free diet.  Discussed with him we can refer him to a different rheumatologist as he did not have a good appointment with Dr Mujica.  Discussed with him that he can also start Cymbalta if he chooses to.        Return in about 4 weeks (around 6/3/2019) for f/u Hypertension.

## 2019-05-13 DIAGNOSIS — M25.50 POLYARTHRALGIA: ICD-10-CM

## 2019-05-13 RX ORDER — CELECOXIB 100 MG/1
100 CAPSULE ORAL 2 TIMES DAILY
Qty: 60 CAP | Refills: 0 | Status: SHIPPED | OUTPATIENT
Start: 2019-05-13 | End: 2019-06-03

## 2019-06-03 ENCOUNTER — OFFICE VISIT (OUTPATIENT)
Dept: MEDICAL GROUP | Facility: PHYSICIAN GROUP | Age: 49
End: 2019-06-03
Payer: OTHER GOVERNMENT

## 2019-06-03 VITALS
HEIGHT: 72 IN | TEMPERATURE: 98.8 F | HEART RATE: 74 BPM | DIASTOLIC BLOOD PRESSURE: 62 MMHG | BODY MASS INDEX: 21.94 KG/M2 | WEIGHT: 162 LBS | RESPIRATION RATE: 12 BRPM | OXYGEN SATURATION: 97 % | SYSTOLIC BLOOD PRESSURE: 102 MMHG

## 2019-06-03 DIAGNOSIS — I10 ESSENTIAL HYPERTENSION: ICD-10-CM

## 2019-06-03 DIAGNOSIS — M25.50 POLYARTHRALGIA: ICD-10-CM

## 2019-06-03 PROCEDURE — 99214 OFFICE O/P EST MOD 30 MIN: CPT | Performed by: FAMILY MEDICINE

## 2019-06-03 NOTE — PROGRESS NOTES
cc: Muscle aches    Subjective:     Michele Griffin is a 48 y.o. male presenting for one-month follow-up of his pain.    1. Polyarthralgia  This is a chronic medical problem which started in October 2018.  He continues to complain of generalized aches and pains and weakness.  He did have an evaluation with Ortho and rheumatology in the past with no clear source of the symptoms.  He recently had an appointment with Dr. Smith and had a spinal injection which did give him some relief in his lower back.  He was also started on Cymbalta 30 mg and he has noticed some improvement in his pain but continues to have fluctuating aches and pains in the upper extremities especially in the shoulder and the elbow as well as the hip and bilateral knees.  Denies any rash denies any fever chills.    2. Essential hypertension  Chronic medical problem for which he continues to take lisinopril 10 mg daily.  Home systolic BPs are in the 116 range.  BP today is 102/62.  Denies any headaches or chest pain.      Review of systems:  See above and negative for shortness of breath or lower extremity edema.  No Known Allergies      Current Outpatient Prescriptions:   •  DULoxetine (CYMBALTA) 30 MG Cap DR Particles, Take 30 mg by mouth every day., Disp: , Rfl:   •  lisinopril (PRINIVIL) 10 MG Tab, Take 1 Tab by mouth every day., Disp: 30 Tab, Rfl: 3  •  Ascorbic Acid (VITAMIN C) 1000 MG Tab, Take  by mouth., Disp: , Rfl:   •  Cholecalciferol (VITAMIN D3) 2000 UNIT Cap, Take 5,000 Units by mouth every day., Disp: , Rfl:   •  levothyroxine (SYNTHROID) 100 MCG Tab, TAKE 1 TABLET BY MOUTH MONDAY THROUGH SATURDAY. SKIP SUNDAYS, Disp: 100 Tab, Rfl: 3  •  valACYclovir (VALTREX) 500 MG Tab, TK 1 T PO  BID TAT, Disp: , Rfl: 3    Allergies, past medical history, past surgical history, family history, social history reviewed and updated    Objective:     Vitals: /62 (BP Location: Left arm, Patient Position: Sitting, BP Cuff Size: Adult)    Pulse 74   Temp 37.1 °C (98.8 °F) (Temporal)   Resp 12   Ht 1.829 m (6')   Wt 73.5 kg (162 lb)   SpO2 97%   BMI 21.97 kg/m²   General:  Alert, pleasant, NAD  Eyes:  normal inspection of conjunctivae and lids, EOMI,   ENMT:  External ears and nose are normal.    Neck  supple,   Heart:  Regular rate and rhythm,  No LE edema  Respiratory:  Normal respiratory effort, Clear to auscultation bilaterally.  Abdomen:   soft, Non-distended,   Skin:  Warm, dry, no rashes,   Musculoskeletal:  Normal gait, Normal digits and nails.  Neurological: No tremors,   Psych:   Affect/mood is normal, judgement is good, memory is intact, grooming is appropriate.    Assessment/Plan:     Michele was seen today for medication management, orders needed and follow-up.    Diagnoses and all orders for this visit:    Polyarthralgia  Chronic medical problem.  Some improvement noted.  Follow-up with Dr. Smith as scheduled.  Discussed with him increasing Cymbalta to 60 mg if necessary.  -     REFERRAL TO NEUROLOGY    Essential hypertension  Chronic medical problem.  Encouraged to continue checking home BPs.  Discussed with him that if the systolics are less than 110 then we should consider decreasing his lisinopril.      Return in about 2 months (around 8/3/2019).

## 2019-06-12 DIAGNOSIS — M25.50 POLYARTHRALGIA: ICD-10-CM

## 2019-06-12 RX ORDER — CELECOXIB 100 MG/1
CAPSULE ORAL
Qty: 60 CAP | Refills: 0 | Status: SHIPPED | OUTPATIENT
Start: 2019-06-12 | End: 2019-08-21

## 2019-08-21 ENCOUNTER — OFFICE VISIT (OUTPATIENT)
Dept: MEDICAL GROUP | Facility: PHYSICIAN GROUP | Age: 49
End: 2019-08-21
Payer: OTHER GOVERNMENT

## 2019-08-21 VITALS
HEIGHT: 72 IN | WEIGHT: 170 LBS | DIASTOLIC BLOOD PRESSURE: 78 MMHG | BODY MASS INDEX: 23.03 KG/M2 | RESPIRATION RATE: 16 BRPM | TEMPERATURE: 98.8 F | HEART RATE: 90 BPM | SYSTOLIC BLOOD PRESSURE: 112 MMHG | OXYGEN SATURATION: 97 %

## 2019-08-21 DIAGNOSIS — E78.2 MIXED HYPERLIPIDEMIA: ICD-10-CM

## 2019-08-21 DIAGNOSIS — G89.29 CHRONIC PAIN OF LEFT KNEE: ICD-10-CM

## 2019-08-21 DIAGNOSIS — M25.562 CHRONIC PAIN OF LEFT KNEE: ICD-10-CM

## 2019-08-21 DIAGNOSIS — I10 ESSENTIAL HYPERTENSION: ICD-10-CM

## 2019-08-21 DIAGNOSIS — E03.4 HYPOTHYROIDISM DUE TO ACQUIRED ATROPHY OF THYROID: ICD-10-CM

## 2019-08-21 PROCEDURE — 99214 OFFICE O/P EST MOD 30 MIN: CPT | Performed by: FAMILY MEDICINE

## 2019-08-21 RX ORDER — DULOXETIN HYDROCHLORIDE 60 MG/1
60 CAPSULE, DELAYED RELEASE ORAL
Refills: 6 | COMMUNITY
Start: 2019-07-14
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
## 2019-09-16 DIAGNOSIS — I10 ESSENTIAL HYPERTENSION: ICD-10-CM

## 2019-09-16 RX ORDER — LISINOPRIL 10 MG/1
TABLET ORAL
Qty: 30 TAB | Refills: 3 | Status: SHIPPED | OUTPATIENT
Start: 2019-09-16 | End: 2019-11-25

## 2019-09-16 NOTE — TELEPHONE ENCOUNTER
Requested Prescriptions     Pending Prescriptions Disp Refills   • lisinopril (PRINIVIL) 10 MG Tab [Pharmacy Med Name: LISINOPRIL 10 MG TABLET] 30 Tab 3     Sig: TAKE 1 TABLET BY MOUTH EVERY DAY   Billie Franklin M.D.

## 2019-10-16 ENCOUNTER — OFFICE VISIT (OUTPATIENT)
Dept: MEDICAL GROUP | Facility: PHYSICIAN GROUP | Age: 49
End: 2019-10-16
Payer: OTHER GOVERNMENT

## 2019-10-16 VITALS
OXYGEN SATURATION: 97 % | HEART RATE: 80 BPM | HEIGHT: 72 IN | WEIGHT: 177 LBS | DIASTOLIC BLOOD PRESSURE: 68 MMHG | TEMPERATURE: 98.6 F | RESPIRATION RATE: 16 BRPM | SYSTOLIC BLOOD PRESSURE: 120 MMHG | BODY MASS INDEX: 23.98 KG/M2

## 2019-10-16 DIAGNOSIS — I82.B11 SUBCLAVIAN VEIN THROMBOSIS, RIGHT (HCC): ICD-10-CM

## 2019-10-16 PROCEDURE — 99214 OFFICE O/P EST MOD 30 MIN: CPT | Performed by: FAMILY MEDICINE

## 2019-10-16 RX ORDER — MELOXICAM 15 MG/1
TABLET ORAL
Refills: 1 | COMMUNITY
Start: 2019-09-19 | End: 2019-10-16

## 2019-10-16 RX ORDER — INFLUENZA A VIRUS A/BRISBANE/02/2018 IVR-190 (H1N1) ANTIGEN (FORMALDEHYDE INACTIVATED), INFLUENZA A VIRUS A/KANSAS/14/2017 X-327 (H3N2) ANTIGEN (FORMALDEHYDE INACTIVATED), INFLUENZA B VIRUS B/PHUKET/3073/2013 ANTIGEN (FORMALDEHYDE INACTIVATED), AND INFLUENZA B VIRUS B/MARYLAND/15/2016 BX-69A ANTIGEN (FORMALDEHYDE INACTIVATED) 15; 15; 15; 15 UG/.5ML; UG/.5ML; UG/.5ML; UG/.5ML
INJECTION, SUSPENSION INTRAMUSCULAR
Refills: 0 | COMMUNITY
Start: 2019-09-26 | End: 2019-11-05

## 2019-10-16 NOTE — LETTER
[facility]  Billie Franklin M.D.  [address]  [place] [address]  Fax: [phone]   Authorization for Release/Disclosure of   Protected Health Information   Name: MICHELE ZAIDI : 1970 SSN: [id number]   Address: [facility] [address]  [place] [address] Phone:    [phone] (home)    I authorize the entity listed below to release/disclose the PHI below to:   [facility]/Billie Franklin M.D. and Billie Franklin M.D.   Provider or Entity Name:  [facility] (please send all records from recent visit)   Address   City, State, [facility]   Phone:      Fax:  [phone]   Reason for request: continuity of care   Information to be released:    [  ] LAST COLONOSCOPY,  including any PATH REPORT and follow-up  [  ] LAST FIT/COLOGUARD RESULT [  ] LAST DEXA  [  ] LAST MAMMOGRAM  [  ] LAST PAP  [  ] LAST LABS [  ] RETINA EXAM REPORT  [  ] IMMUNIZATION RECORDS  [ xx ] Release all info      [  ] Check here and initial the line next to each item to release ALL health information INCLUDING  _____ Care and treatment for drug and / or alcohol abuse  _____ HIV testing, infection status, or AIDS  _____ Genetic Testing    DATES OF SERVICE OR TIME PERIOD TO BE DISCLOSED: _____________  I understand and acknowledge that:  * This Authorization may be revoked at any time by you in writing, except if your health information has already been used or disclosed.  * Your health information that will be used or disclosed as a result of you signing this authorization could be re-disclosed by the recipient. If this occurs, your re-disclosed health information may no longer be protected by State or Federal laws.  * You may refuse to sign this Authorization. Your refusal will not affect your ability to obtain treatment.  * This Authorization becomes effective upon signing and will  on (date) __________.      If no date is indicated, this Authorization will  one (1) year from the signature date.    Name: Michele Rice  [facility]    Signature:   Date:     10/16/2019       PLEASE FAX REQUESTED RECORDS BACK TO: [phone]
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
## 2019-11-05 ENCOUNTER — OFFICE VISIT (OUTPATIENT)
Dept: MEDICAL GROUP | Facility: PHYSICIAN GROUP | Age: 49
End: 2019-11-05
Payer: OTHER GOVERNMENT

## 2019-11-05 VITALS
HEART RATE: 70 BPM | TEMPERATURE: 98.8 F | WEIGHT: 176 LBS | SYSTOLIC BLOOD PRESSURE: 120 MMHG | HEIGHT: 72 IN | RESPIRATION RATE: 20 BRPM | DIASTOLIC BLOOD PRESSURE: 70 MMHG | BODY MASS INDEX: 23.84 KG/M2 | OXYGEN SATURATION: 98 %

## 2019-11-05 DIAGNOSIS — M25.50 POLYARTHRALGIA: ICD-10-CM

## 2019-11-05 DIAGNOSIS — I82.B11 SUBCLAVIAN VEIN THROMBOSIS, RIGHT (HCC): ICD-10-CM

## 2019-11-05 DIAGNOSIS — I10 ESSENTIAL HYPERTENSION: ICD-10-CM

## 2019-11-05 PROCEDURE — 99214 OFFICE O/P EST MOD 30 MIN: CPT | Performed by: FAMILY MEDICINE

## 2019-11-05 NOTE — PROGRESS NOTES
cc: Right subclavian vein thrombosis    Subjective:     Michele Griffin is a 49 y.o. male presenting for follow-up of his right subclavian vein thrombosis and to update me with his recent follow-up appointments    1. Subclavian vein thrombosis, right (HCC)  Chronic medical diagnosis.  He was hospitalized at [facility] last month on October 11 after noticing that his right arm was swollen and tender.  He was diagnosed with right subclavian vein thrombosis.  Was started on Xarelto and asked to follow-up with Dr. Sheridan.  He did have his follow-up with Dr. Sheridan over the last few weeks.  And he was actually referred to [facility].  Records were obtained from [facility] and reviewed with the patient.  He had a consultation there on November 1.  He is scheduled for a right rib resection and scalenectomy on November 12.  Xarelto has been discontinued and he is currently on Lovenox 80 mg every 12 hours.  He does complain of right arm swelling.  Mentions that it is actually improving.  Typically wears a compression stocking on the right arm but does not have it on currently.  Has been noticing numbness and tingling to the right arm since this new diagnosis.  Currently denies it.    2. Essential hypertension  Chronic medical diagnosis.  Currently taking lisinopril 10 mg daily.  Denies any headaches or chest pain.  Blood pressure today is 120/70.    3. Polyarthralgia  Chronic medical diagnosis.  This is been going on for at least the last 12 months.  He has been following up with PMR, Dr. Smith.  Mentions that he has an appointment with her tomorrow.  Thinks that he may need a trigger point injection.  He was previously started on Cymbalta 30 mg by her.  And his dose has been increased to 60 mg.  Continues to complain of fluctuating aches and pains in the shoulder, elbow, and lower extremity.  He is unsure if some of this pain is due to his new blood clot or if it is from before.      Review of systems:  See above and  negative for fever chills.  Denies any coughing or shortness of breath.  No Known Allergies      Current Outpatient Medications:   •  enoxaparin (LOVENOX) 80 MG/0.8ML Solution inj, INJECT 1 PEN SQ EVERY 12 HOURS, Disp: , Rfl: 0  •  lisinopril (PRINIVIL) 10 MG Tab, TAKE 1 TABLET BY MOUTH EVERY DAY, Disp: 30 Tab, Rfl: 3  •  DULoxetine (CYMBALTA) 60 MG Cap DR Particles delayed-release capsule, Take 60 mg by mouth every day., Disp: , Rfl: 6  •  valACYclovir (VALTREX) 500 MG Tab, TK 1 T PO  BID TAT, Disp: , Rfl: 3  •  Ascorbic Acid (VITAMIN C) 1000 MG Tab, Take  by mouth., Disp: , Rfl:   •  Cholecalciferol (VITAMIN D3) 2000 UNIT Cap, Take 5,000 Units by mouth every day., Disp: , Rfl:   •  levothyroxine (SYNTHROID) 100 MCG Tab, TAKE 1 TABLET BY MOUTH MONDAY THROUGH SATURDAY. SKIP SUNDAYS, Disp: 100 Tab, Rfl: 3    Allergies, past medical history, past surgical history, family history, social history reviewed and updated    Objective:     Vitals: /70 (BP Location: Left arm, Patient Position: Sitting, BP Cuff Size: Adult)   Pulse 70   Temp 37.1 °C (98.8 °F) (Temporal)   Resp 20   Ht 1.829 m (6')   Wt 79.8 kg (176 lb)   SpO2 98%   BMI 23.87 kg/m²   General:  Alert, pleasant, NAD  Eyes:  normal inspection of conjunctivae and lids, EOMI,   ENMT:  External ears and nose are normal.    Neck  supple,   Heart:  Regular rate and rhythm,  No LE edema  Respiratory:  Normal respiratory effort, Clear to auscultation bilaterally.  Abdomen:   soft, Non-distended,   Skin:  Warm, dry, no rashes,   Musculoskeletal: Right UE 1+ edema Normal gait, Normal digits and nails.  Neurological: No tremors,   Psych:   Affect/mood is normal, judgement is good, memory is intact, grooming is appropriate.    Assessment/Plan:     Michele was seen today for follow-up.    Diagnoses and all orders for this visit:    Subclavian vein thrombosis, right (HCC)  Chronic medical diagnosis.  Continue with Lovenox.  Scheduled for surgery next week at   [facility].    Essential hypertension  Chronic medical diagnosis.  Stable with lisinopril.  Encouraged to get labs done prior to next appointment with me in 3 weeks.    Polyarthralgia  Chronic medical diagnosis.  Not improving.  Has a follow-up appointment with Dr. Smith tomorrow.  Encouraged to discuss with her if his Cymbalta dose can be increased.        Return if symptoms worsen or fail to improve.  This note was created using voice recognition software (Dragon). The accuracy of the dictation is limited by the abilities of the software. I have reviewed the note prior to signing, however some errors in grammar and context are still possible. If you have any questions related to this note please do not hesitate to contact our office.

## 2019-11-11 DIAGNOSIS — I10 ESSENTIAL HYPERTENSION: ICD-10-CM

## 2019-11-11 DIAGNOSIS — E78.2 MIXED HYPERLIPIDEMIA: ICD-10-CM

## 2019-11-14 ENCOUNTER — TELEPHONE (OUTPATIENT)
Dept: VASCULAR LAB | Facility: MEDICAL CENTER | Age: 49
End: 2019-11-14

## 2019-11-14 NOTE — TELEPHONE ENCOUNTER
"[facility]    Spoke with [facility] after pt had recent thoracic outlet syndrome correction.  Pt was transitioned to warfarin yesterday and their facility is asking for our clinic to follow the pt.  They will be faxing a referral to our clinic, but in the meantime I have already left a VM with the pt requesting a call back.    [facility] has requested two days on enoxaparin.  They do not require the pt to be therapeutic before enoxaparin discontinuation.  This is not a \"bridge\".    INR goal 2-3    Received anticoagulation referral.    PCP: Rigoberto ([facility])  INS: KATHLEEN  [facility], but can be [facility] at [facility] well.  (Any location)    LM for pt to call clinic and establish care.     Bhaskar Soriano, PharmD     "

## 2019-11-15 ENCOUNTER — ANTICOAGULATION VISIT (OUTPATIENT)
Dept: VASCULAR LAB | Facility: MEDICAL CENTER | Age: 49
End: 2019-11-15
Attending: INTERNAL MEDICINE
Payer: OTHER GOVERNMENT

## 2019-11-15 VITALS — HEART RATE: 72 BPM | SYSTOLIC BLOOD PRESSURE: 134 MMHG | DIASTOLIC BLOOD PRESSURE: 86 MMHG

## 2019-11-15 DIAGNOSIS — I82.B11 THROMBOSIS OF RIGHT SUBCLAVIAN VEIN (HCC): ICD-10-CM

## 2019-11-15 LAB
INR BLD: 1.4 (ref 0.9–1.2)
INR PPP: 1.4 (ref 2–3.5)

## 2019-11-15 PROCEDURE — 85610 PROTHROMBIN TIME: CPT

## 2019-11-15 PROCEDURE — 99202 OFFICE O/P NEW SF 15 MIN: CPT

## 2019-11-15 NOTE — PROGRESS NOTES
Pt is new to warfarin and new to [facility].  Discussed indication for warfarin therapy and INR goal range. Explained our services, hours of operation, warfarin therapy, potential SE, potential DI. Discussed diet at length, with an emphasis on foods rich in vitamin K.  Discussed monitoring parameters, such as blood in urine, blood in stool, discussed what to do if a dose is missed, or suspected as missed.  Emphasized importance of compliance including follow up. Discussed lifestyle choices of ETOH & smoking and its impact on therapy.      Patient is not currently on aspirin therapy and has already been educated not to take aspirin.    Can pt be transitioned to DOAC? Patient stated he was on Xarelto for October 15-25 and had subclavian clot, thus patient is transitioning to warfarin        Pt denies any unusual s/s of bleeding, bruising, clotting or any changes to diet or medications.    Added [facility] to care team    Anticoagulation Summary  As of 11/15/2019    INR goal:   2.0-3.0   TTR:   --   INR used for dosin.40! (11/15/2019)   Warfarin maintenance plan:   5 mg (5 mg x 1) every day   Weekly warfarin total:   35 mg   Plan last modified:   Omero Hull, PharmD (11/15/2019)   Next INR check:   2019   Target end date:            Anticoagulation Episode Summary     INR check location:       Preferred lab:       Send INR reminders to:       Comments:                   Anticoagulation Patient Findings      HPI:  Michele Griffin seen in clinic today, on anticoagulation therapy with warfarin for subclavian vein thrombosis.   Changes to current medical/health status since last appt: none  Denies signs/symptoms of bleeding and/or thrombosis since the last appt.    Denies any interval changes to diet  Denies any interval changes to medications since last appt.   Denies any complications or cost restrictions with current therapy.   BP recorded in vitals.       A/P   INR   sub-therapeutic.  Patient has just started on warfarin 2 days ago. Patient is finishing up his second day of Lovenox and will continue 5 mg daily warfarin with a bolus on 11/16 of 7.5 mg and with an INR to be assessed in 3 days to begin establishing a maintenance regimen.       Michele is a 49 y.o male who presents to the clinic as a new start warfarin for subclavian vein thrombosis. Patient was hospitalized at [facility] on October 11th due to patient's right arm being swollen, tender, and causing pain. Patient was started on Xarelto for the subclavian vein thrombosis. Patient was on Xarelto from October 15th to 25th and then patient stated another clot occurred so patient was taken off of the Xarelto therapy and patient was put on Lovenox 80mg (1 mg/kg) every 12 hours. Patient had a right rib resection and scalenectomy on November 12th at [facility]. [facility] requested for patient to be on two days of Enoxaparin therapy, 11/14 and 11/15 so not a true bridging and started patient on warfarin 5 mg on 11/13 as well. Patient presents to clinic on 11/15 finishing up his last day of Enoxaparin therapy and has an INR of 1.4 Patient instructed to bolus 7.5 mg of warfarin on Saturday 11/16 and patient will be assessed again on Monday 11/18.     Follow up appointment in 3 days     Omero Hull, ChaparroD

## 2019-11-18 ENCOUNTER — ANTICOAGULATION VISIT (OUTPATIENT)
Dept: VASCULAR LAB | Facility: MEDICAL CENTER | Age: 49
End: 2019-11-18
Attending: INTERNAL MEDICINE
Payer: OTHER GOVERNMENT

## 2019-11-18 VITALS — SYSTOLIC BLOOD PRESSURE: 117 MMHG | HEART RATE: 79 BPM | DIASTOLIC BLOOD PRESSURE: 76 MMHG

## 2019-11-18 DIAGNOSIS — I82.B11 THROMBOSIS OF RIGHT SUBCLAVIAN VEIN (HCC): ICD-10-CM

## 2019-11-18 LAB
INR BLD: 2.8 (ref 0.9–1.2)
INR PPP: 2.8 (ref 2–3.5)

## 2019-11-18 PROCEDURE — 85610 PROTHROMBIN TIME: CPT

## 2019-11-18 RX ORDER — WARFARIN SODIUM 5 MG/1
5 TABLET ORAL DAILY
Qty: 30 TAB | Refills: 2 | Status: SHIPPED | OUTPATIENT
Start: 2019-11-18 | End: 2019-12-16 | Stop reason: SDUPTHER

## 2019-11-19 ENCOUNTER — TELEPHONE (OUTPATIENT)
Dept: VASCULAR LAB | Facility: MEDICAL CENTER | Age: 49
End: 2019-11-19

## 2019-11-19 NOTE — TELEPHONE ENCOUNTER
Left voicemail message for patient to reschedule anticoagulation.  Would like to see him in clinic 11-21 or 11-22.    Angeli Banks, Clinical Pharmacist, CDE, CACP

## 2019-11-19 NOTE — PROGRESS NOTES
Anticoagulation Summary  As of 2019    INR goal:   2.0-3.0   TTR:   --   INR used for dosin.80 (2019)   Warfarin maintenance plan:   5 mg (5 mg x 1) every day   Weekly warfarin total:   35 mg   Plan last modified:   Omero Hull, PharmD (11/15/2019)   Next INR check:   2019   Target end date:            Anticoagulation Episode Summary     INR check location:       Preferred lab:       Send INR reminders to:       Comments:                   Anticoagulation Patient Findings  Patient Findings     Negatives:   Signs/symptoms of thrombosis, Signs/symptoms of bleeding, Laboratory test error suspected, Change in health, Change in alcohol use, Change in activity, Upcoming invasive procedure, Emergency department visit, Upcoming dental procedure, Missed doses, Extra doses, Change in medications, Change in diet/appetite, Hospital admission, Bruising, Other complaints          HPI:  Michele Griffin seen in clinic today, on anticoagulation therapy with warfarin for hx of venous thoracic outlet syndrome   Changes to current medical/health status since last appt: none  Denies signs/symptoms of bleeding and/or thrombosis since the last appt.    Denies any interval changes to diet  Denies any interval changes to medications since last appt.   Denies any complications or cost restrictions with current therapy.   BP recorded in vitals.   Patient confirmed current dosing regimen    A/P   INR is therapeutic at 2.8.   Pt has already taken 7.5 mg dose today. Pt reports taking his doses around 1300. Instructed patient to continue current regimen of 5 mg daily.     Follow up appointment in 1 week(s).    Shimon Rios, ChaparroD

## 2019-11-20 NOTE — TELEPHONE ENCOUNTER
Initial anticoagulation clinic note and most recent PCP note reviewed    Patient on anticoagulation for subclavian vein thrombosis.  He is now status post first rib resection    We will continue with 3 months of anticoagulation with warfarin as recommended.  After 3 months we can go back to PCP and vascular surgeon to see whether or not it can be discontinued    We will continue to partner and managing warfarin dosing and following INR.  We will defer all other management including any needed surveillance to PCP and vascular surgery    Michael J. Bloch, MD  [facility]    CC:  BIN Sheridan

## 2019-11-21 ENCOUNTER — ANTICOAGULATION VISIT (OUTPATIENT)
Dept: VASCULAR LAB | Facility: MEDICAL CENTER | Age: 49
End: 2019-11-21
Attending: INTERNAL MEDICINE
Payer: OTHER GOVERNMENT

## 2019-11-21 VITALS — HEART RATE: 83 BPM | SYSTOLIC BLOOD PRESSURE: 124 MMHG | DIASTOLIC BLOOD PRESSURE: 87 MMHG

## 2019-11-21 DIAGNOSIS — I82.B11 SUBCLAVIAN VEIN THROMBOSIS, RIGHT (HCC): ICD-10-CM

## 2019-11-21 LAB
INR BLD: 2.2 (ref 0.9–1.2)
INR PPP: 2.2 (ref 2–3.5)

## 2019-11-21 PROCEDURE — 99211 OFF/OP EST MAY X REQ PHY/QHP: CPT | Performed by: NURSE PRACTITIONER

## 2019-11-21 PROCEDURE — 85610 PROTHROMBIN TIME: CPT

## 2019-11-22 NOTE — PROGRESS NOTES
Anticoagulation Summary  As of 2019    INR goal:   2.0-3.0   TTR:   --   INR used for dosin.20 (2019)   Warfarin maintenance plan:   7.5 mg (5 mg x 1.5) every Mon; 5 mg (5 mg x 1) all other days   Weekly warfarin total:   37.5 mg   Plan last modified:   DALJIT Tai (2019)   Next INR check:   2019   Target end date:       Indications    Subclavian vein thrombosis  right (HCC) [I82.B11]             Anticoagulation Episode Summary     INR check location:       Preferred lab:       Send INR reminders to:       Comments:         Anticoagulation Care Providers     Provider Role Specialty Phone number    [facility] Responsible  [phone]        Anticoagulation Patient Findings      HPI:  Michele Griffin seen in clinic today for follow up on anticoagulation therapy in the presence of subclavian vein thrombosis s/p 1st rib resection.   Denies any changes to current medical/health status since last appointment.   Denies any medication or diet changes.   No current symptoms of bleeding or thrombosis reported.    A/P:   INR therapeutic. He takes his warfarin in the AM. Has already taken 5 mg this AM.  Continue current regimen.   BP recorded in vitals.    Follow up appointment on Tuesday.    Next Appointment:  at 4:15 pm.    Aziza KOLB

## 2019-11-25 ENCOUNTER — OFFICE VISIT (OUTPATIENT)
Dept: MEDICAL GROUP | Facility: PHYSICIAN GROUP | Age: 49
End: 2019-11-25
Payer: OTHER GOVERNMENT

## 2019-11-25 VITALS
SYSTOLIC BLOOD PRESSURE: 118 MMHG | HEIGHT: 72 IN | HEART RATE: 90 BPM | OXYGEN SATURATION: 97 % | RESPIRATION RATE: 20 BRPM | BODY MASS INDEX: 23.84 KG/M2 | WEIGHT: 176 LBS | DIASTOLIC BLOOD PRESSURE: 66 MMHG | TEMPERATURE: 99.9 F

## 2019-11-25 DIAGNOSIS — E78.2 MIXED HYPERLIPIDEMIA: ICD-10-CM

## 2019-11-25 DIAGNOSIS — Z12.11 SCREEN FOR COLON CANCER: ICD-10-CM

## 2019-11-25 DIAGNOSIS — Z23 NEED FOR VACCINATION: ICD-10-CM

## 2019-11-25 DIAGNOSIS — I82.B11 SUBCLAVIAN VEIN THROMBOSIS, RIGHT (HCC): ICD-10-CM

## 2019-11-25 DIAGNOSIS — I10 ESSENTIAL HYPERTENSION: ICD-10-CM

## 2019-11-25 DIAGNOSIS — E03.4 HYPOTHYROIDISM DUE TO ACQUIRED ATROPHY OF THYROID: ICD-10-CM

## 2019-11-25 PROBLEM — R79.89 LOW VITAMIN D LEVEL: Status: RESOLVED | Noted: 2018-08-26 | Resolved: 2019-11-25

## 2019-11-25 PROCEDURE — 99396 PREV VISIT EST AGE 40-64: CPT | Performed by: FAMILY MEDICINE

## 2019-11-25 RX ORDER — WARFARIN SODIUM 5 MG/1
5 TABLET ORAL
COMMUNITY
Start: 2019-11-14 | End: 2019-11-25

## 2019-11-25 RX ORDER — LISINOPRIL 10 MG/1
10 TABLET ORAL
COMMUNITY
End: 2019-12-23

## 2019-11-25 RX ORDER — LEVOTHYROXINE SODIUM 0.1 MG/1
100 TABLET ORAL
COMMUNITY
End: 2019-11-25

## 2019-11-25 NOTE — PROGRESS NOTES
cc: Annual exam    Subjective:     Michele Griffin is a 49 y.o. male presenting for his annual exam.  Mentions that he recently underwent right rib resection and scalenectomy.    Right subclavian vein thrombosis  Chronic medical diagnosis.  Recently went to [facility] and underwent right rib resection and scalenectomy on November 12.  Has since discontinued the Lovenox and is currently on warfarin.  Continues to follow-up at [facility] for INRs.  Denies any nosebleeds or easy bruising.  Mentions that he has a video conference with his surgeon on December 13 and then a follow-up in May.  Doing well postop.  Mentions that he is back to work today.  Is full range of motion and continues to be in physical therapy.    Essential hypertension  Chronic medical diagnosis.  Currently taking lisinopril 10 mg daily.  Blood pressure today is 118/66.  Denies any headaches or chest pain.    Hypothyroid  Chronic medical diagnosis.  Currently taking levothyroxine 100 mcg daily.  Denies any mood swings.  Will need to get recent labs done.    Mixed hyperlipidemia  Chronic medical diagnosis.  Recently had labs done which showed increase in total cholesterol as well as triglycerides and LDL.  ASCVD calculated risk of 7.5% discussed in detail with patient.  For now would like to do a trial of diet and lifestyle modifications.  7.5%      Review of systems:  See above and negative for fever chills.  No Known Allergies      Current Outpatient Medications:   •  lisinopril (PRINIVIL) 10 MG Tab, Take 10 mg by mouth., Disp: , Rfl:   •  Zoster Vac Recomb Adjuvanted (SHINGRIX) 50 MCG/0.5ML Recon Susp, 0.5 mL by Intramuscular route Once for 1 dose., Disp: 0.5 mL, Rfl: 0  •  warfarin (COUMADIN) 5 MG Tab, Take 1 Tab by mouth every day., Disp: 30 Tab, Rfl: 2  •  DULoxetine (CYMBALTA) 60 MG Cap DR Particles delayed-release capsule, Take 60 mg by mouth every day., Disp: , Rfl: 6  •  valACYclovir (VALTREX) 500 MG Tab, TK 1 T PO  BID TAT, Disp: , Rfl:  3  •  Ascorbic Acid (VITAMIN C) 1000 MG Tab, Take  by mouth., Disp: , Rfl:   •  Cholecalciferol (VITAMIN D3) 2000 UNIT Cap, Take 8,000 Units by mouth every day., Disp: , Rfl:   •  levothyroxine (SYNTHROID) 100 MCG Tab, TAKE 1 TABLET BY MOUTH MONDAY THROUGH SATURDAY. SKIP SUNDAYS, Disp: 100 Tab, Rfl: 3    Allergies, past medical history, past surgical history, family history, social history reviewed and updated    Objective:     Vitals: /66 (BP Location: Left arm, Patient Position: Sitting, BP Cuff Size: Adult)   Pulse 90   Temp 37.7 °C (99.9 °F) (Temporal)   Resp 20   Ht 1.829 m (6')   Wt 79.8 kg (176 lb)   SpO2 97%   BMI 23.87 kg/m²   General:  Alert, pleasant, NAD  Eyes:  normal inspection of conjunctivae and lids, EOMI,   ENMT:  External ears and nose are normal.    Neck  supple,   Heart:  Regular rate and rhythm,  No LE edema  Respiratory:  Normal respiratory effort, Clear to auscultation bilaterally.  Abdomen:   soft, Non-distended,   Skin:  Warm, dry, no rashes,   Musculoskeletal:  Normal gait, Normal digits and nails.  Neurological: No tremors,   Psych:   Affect/mood is normal, judgement is good, memory is intact, grooming is appropriate.    Assessment/Plan:     Michele was seen today for annual exam.    Diagnoses and all orders for this visit:    Subclavian vein thrombosis, right (HCC)  Chronic medical diagnosis.  Status post surgery.  Continue with warfarin.    Essential hypertension  Chronic medical diagnosis.  Stable.  Continue with lisinopril    Hypothyroidism due to acquired atrophy of thyroid  Chronic medical diagnosis.  Stable.  Continue with levothyroxine.  Need for vaccination  -     Zoster Vac Recomb Adjuvanted (SHINGRIX) 50 MCG/0.5ML Recon Susp; 0.5 mL by Intramuscular route Once for 1 dose.    Mixed hyperlipidemia  Chronic medical diagnosis.  Not improving.  Diet lifestyle modifications discussed with patient.    Screen for colon cancer  -     REFERRAL TO GI FOR  COLONOSCOPY          Return in about 4 months (around 3/25/2020) for f/u Hypertension.     Patient Counseling:  --Discussed moderation in sodium/caffeine intake, saturated fat and cholesterol, caloric balance, sufficient fresh fruits/vegetables, fiber, iron, and 0.4-0.8mg of folate supplement per day (for females capable of pregnancy).  --Discussed brushing, flossing, and dental visits.   --Encouraged regular exercise.   --Discussed tobacco, alcohol, or other drug use; availability of treatment for abuse.   --Discussed sexually transmitted infections, partner selection, use of condoms, avoidance of unintended pregnancy and contraceptive alternatives.  --Injury prevention: Discussed safety belts, safety helmets, smoke detector, etc.  This note was created using voice recognition software (Dragon). The accuracy of the dictation is limited by the abilities of the software. I have reviewed the note prior to signing, however some errors in grammar and context are still possible. If you have any questions related to this note please do not hesitate to contact our office.

## 2019-11-26 ENCOUNTER — ANTICOAGULATION VISIT (OUTPATIENT)
Dept: VASCULAR LAB | Facility: MEDICAL CENTER | Age: 49
End: 2019-11-26
Attending: INTERNAL MEDICINE
Payer: OTHER GOVERNMENT

## 2019-11-26 DIAGNOSIS — I82.B11 SUBCLAVIAN VEIN THROMBOSIS, RIGHT (HCC): ICD-10-CM

## 2019-11-26 LAB — INR PPP: 4.8 (ref 2–3.5)

## 2019-11-26 PROCEDURE — 99212 OFFICE O/P EST SF 10 MIN: CPT

## 2019-11-26 PROCEDURE — 85610 PROTHROMBIN TIME: CPT

## 2019-11-27 NOTE — PROGRESS NOTES
Anticoagulation Summary  As of 2019    INR goal:   2.0-3.0   TTR:   0.0 % (1 d)   INR used for dosin.80! (2019)   Warfarin maintenance plan:   5 mg (5 mg x 1) every day   Weekly warfarin total:   35 mg   Plan last modified:   Bhaskar Soriano, PharmD (2019)   Next INR check:   2019   Target end date:       Indications    Subclavian vein thrombosis  right (HCC) [I82.B11]             Anticoagulation Episode Summary     INR check location:       Preferred lab:       Send INR reminders to:       Comments:         Anticoagulation Care Providers     Provider Role Specialty Phone number    [facility] Responsible  [phone]        Anticoagulation Patient Findings      HPI:  Michele Griffin seen in clinic today, on anticoagulation therapy with warfarin for Subclavian vein thrombosis.   Changes to current medical/health status since last appt: none  Denies signs/symptoms of bleeding and/or thrombosis since the last appt.    Denies any interval changes to diet  Denies any interval changes to medications since last appt.   Denies any complications or cost restrictions with current therapy.   Declines vitals.     A/P   INR  SUPRA-therapeutic.   Hold x2 days then begin 6% reduced regimen.     Follow up appointment in 6 days.     Bhaskar Soriano, ChaparroD

## 2019-12-02 ENCOUNTER — ANTICOAGULATION VISIT (OUTPATIENT)
Dept: VASCULAR LAB | Facility: MEDICAL CENTER | Age: 49
End: 2019-12-02
Attending: INTERNAL MEDICINE
Payer: OTHER GOVERNMENT

## 2019-12-02 DIAGNOSIS — I82.B11 SUBCLAVIAN VEIN THROMBOSIS, RIGHT (HCC): ICD-10-CM

## 2019-12-02 LAB
INR BLD: 3 (ref 0.9–1.2)
INR BLD: 4.8 (ref 0.9–1.2)
INR PPP: 3 (ref 2–3.5)

## 2019-12-02 PROCEDURE — 99211 OFF/OP EST MAY X REQ PHY/QHP: CPT

## 2019-12-02 PROCEDURE — 85610 PROTHROMBIN TIME: CPT

## 2019-12-02 NOTE — PROGRESS NOTES
Anticoagulation Summary  As of 12/2/2019    INR goal:   2.0-3.0   TTR:   0.0 % (1 wk)   INR used for dosing:   3.00 (12/2/2019)   Warfarin maintenance plan:   5 mg (5 mg x 1) every day   Weekly warfarin total:   35 mg   Plan last modified:   Bhaskar Soriano, PharmD (11/26/2019)   Next INR check:   12/9/2019   Target end date:       Indications    Subclavian vein thrombosis  right (HCC) [I82.B11]             Anticoagulation Episode Summary     INR check location:       Preferred lab:       Send INR reminders to:       Comments:         Anticoagulation Care Providers     Provider Role Specialty Phone number    [facility] Responsible  [phone]        Anticoagulation Patient Findings    HPI:  Michele Griffin seen in clinic today, on anticoagulation therapy with warfarin for subclavian vein thrombosis.  Changes to current medical/health status since last appt: none  Denies signs/symptoms of bleeding and/or thrombosis since the last appt.    Denies any interval changes to diet  Denies any interval changes to medications since last appt.   Denies any complications or cost restrictions with current therapy.     A/P   INR is therapeutic at 2.0  Pt to continue current warfarin therapy regimen of 5 mg daily.  Pt to contact clinic for any s/s of unusual bleeding, bruising, clotting, or any changes to diet or medication.       Follow up appointment in 1 week(s).    Danielle Villavicencio, ChaparroD

## 2019-12-04 DIAGNOSIS — Z15.89 HLA B27 POSITIVE: ICD-10-CM

## 2019-12-09 ENCOUNTER — ANTICOAGULATION VISIT (OUTPATIENT)
Dept: VASCULAR LAB | Facility: MEDICAL CENTER | Age: 49
End: 2019-12-09
Attending: INTERNAL MEDICINE
Payer: OTHER GOVERNMENT

## 2019-12-09 DIAGNOSIS — I82.B11 SUBCLAVIAN VEIN THROMBOSIS, RIGHT (HCC): ICD-10-CM

## 2019-12-09 LAB
INR BLD: 2.8 (ref 0.9–1.2)
INR PPP: 2.8 (ref 2–3.5)

## 2019-12-09 PROCEDURE — 85610 PROTHROMBIN TIME: CPT

## 2019-12-09 PROCEDURE — 99211 OFF/OP EST MAY X REQ PHY/QHP: CPT

## 2019-12-09 NOTE — PROGRESS NOTES
Anticoagulation Summary  As of 2019    INR goal:   2.0-3.0   TTR:   50.0 % (2 wk)   INR used for dosin.80 (2019)   Warfarin maintenance plan:   5 mg (5 mg x 1) every day   Weekly warfarin total:   35 mg   Plan last modified:   Bhaskar Soriano, PharmD (2019)   Next INR check:   2019   Target end date:   2020    Indications    Subclavian vein thrombosis  right (HCC) [I82.B11]             Anticoagulation Episode Summary     INR check location:       Preferred lab:       Send INR reminders to:       Comments:         Anticoagulation Care Providers     Provider Role Specialty Phone number    [facility] Responsible  [phone]        Anticoagulation Patient Findings  Patient Findings     Negatives:   Signs/symptoms of thrombosis, Signs/symptoms of bleeding, Laboratory test error suspected, Change in health, Change in alcohol use, Change in activity, Upcoming invasive procedure, Emergency department visit, Upcoming dental procedure, Missed doses, Extra doses, Change in medications, Change in diet/appetite, Hospital admission, Bruising, Other complaints          HPI:  Michele Griffin seen in clinic today, on anticoagulation therapy with warfarin for subclavian vein thrombosis.  Changes to current medical/health status since last appt: none  Denies signs/symptoms of bleeding and/or thrombosis since the last appt.    Denies any interval changes to diet  Denies any interval changes to medications since last appt.   Denies any complications or cost restrictions with current therapy.   BP declined today.  Confirmed current dosing regimen.     Patient's previous INR was therapeutic at 3.0 on 19, at which time patient was instructed to continue with current warfarin regimen. He returns to clinic today to recheck INR to ensure it is therapeutic and thus preventing possible clotting and/or bleeding/bruising complications.    A/P   INR is therapeutic today at  2.8.  Patient instructed to continue with the current warfarin dosing regimen, and asked to follow up again in 1 week.     Next appt: Monday, Dec 16, 2019  11:45am    Shanta Crane PharmD

## 2019-12-16 ENCOUNTER — ANTICOAGULATION VISIT (OUTPATIENT)
Dept: VASCULAR LAB | Facility: MEDICAL CENTER | Age: 49
End: 2019-12-16
Attending: INTERNAL MEDICINE
Payer: OTHER GOVERNMENT

## 2019-12-16 VITALS — HEART RATE: 68 BPM | SYSTOLIC BLOOD PRESSURE: 140 MMHG | DIASTOLIC BLOOD PRESSURE: 98 MMHG

## 2019-12-16 DIAGNOSIS — I82.B11 SUBCLAVIAN VEIN THROMBOSIS, RIGHT (HCC): ICD-10-CM

## 2019-12-16 LAB
INR BLD: 2.6 (ref 0.9–1.2)
INR PPP: 2.6 (ref 2–3.5)

## 2019-12-16 PROCEDURE — 85610 PROTHROMBIN TIME: CPT

## 2019-12-16 PROCEDURE — 99211 OFF/OP EST MAY X REQ PHY/QHP: CPT | Performed by: NURSE PRACTITIONER

## 2019-12-16 RX ORDER — WARFARIN SODIUM 5 MG/1
5 TABLET ORAL DAILY
Qty: 90 TAB | Refills: 1 | Status: SHIPPED | OUTPATIENT
Start: 2019-12-16 | End: 2020-06-23

## 2019-12-16 NOTE — PROGRESS NOTES
Anticoagulation Summary  As of 2019    INR goal:   2.0-3.0   TTR:   66.7 % (3 wk)   INR used for dosin.60 (2019)   Warfarin maintenance plan:   5 mg (5 mg x 1) every day   Weekly warfarin total:   35 mg   Plan last modified:   Bhaskar Soriano, PharmD (2019)   Next INR check:   2020   Target end date:   2020    Indications    Subclavian vein thrombosis  right (HCC) [I82.B11]             Anticoagulation Episode Summary     INR check location:       Preferred lab:       Send INR reminders to:       Comments:         Anticoagulation Care Providers     Provider Role Specialty Phone number    [facility] Responsible  [phone]        Anticoagulation Patient Findings      HPI:  Michele Griffin seen in clinic today for follow up on anticoagulation therapy in the presence of subclavian vein clot.   Denies any changes to current medical/health status since last appointment.   Denies any medication or diet changes.   No current symptoms of bleeding or thrombosis reported.    A/P:   INR therapeutic.   Continue current regimen.   BP recorded in vitals.    Follow up appointment in 2 week(s).    Next Appointment:  at 10:45 am.    Aziza KOLB

## 2019-12-23 RX ORDER — LISINOPRIL 10 MG/1
10 TABLET ORAL DAILY
Qty: 30 TAB | Refills: 5 | Status: SHIPPED | OUTPATIENT
Start: 2019-12-23 | End: 2020-01-22

## 2019-12-23 NOTE — TELEPHONE ENCOUNTER
Requested Prescriptions     Pending Prescriptions Disp Refills   • lisinopril (PRINIVIL) 10 MG Tab [Pharmacy Med Name: LISINOPRIL 10 MG TABLET] 30 Tab 5     Sig: Take 1 Tab by mouth every day for 30 days.   Billie Franklin M.D.

## 2020-01-02 ENCOUNTER — ANTICOAGULATION VISIT (OUTPATIENT)
Dept: VASCULAR LAB | Facility: MEDICAL CENTER | Age: 50
End: 2020-01-02
Attending: INTERNAL MEDICINE
Payer: OTHER GOVERNMENT

## 2020-01-02 VITALS — DIASTOLIC BLOOD PRESSURE: 101 MMHG | SYSTOLIC BLOOD PRESSURE: 138 MMHG | HEART RATE: 80 BPM

## 2020-01-02 DIAGNOSIS — I82.B11 SUBCLAVIAN VEIN THROMBOSIS, RIGHT (HCC): ICD-10-CM

## 2020-01-02 LAB
INR BLD: 2.1 (ref 0.9–1.2)
INR PPP: 2.1 (ref 2–3.5)

## 2020-01-02 PROCEDURE — 85610 PROTHROMBIN TIME: CPT

## 2020-01-02 PROCEDURE — 99211 OFF/OP EST MAY X REQ PHY/QHP: CPT | Performed by: NURSE PRACTITIONER

## 2020-01-02 NOTE — PROGRESS NOTES
Anticoagulation Summary  As of 2020    INR goal:   2.0-3.0   TTR:   81.6 % (1.3 mo)   INR used for dosin.10 (2020)   Warfarin maintenance plan:   5 mg (5 mg x 1) every day   Weekly warfarin total:   35 mg   Plan last modified:   Bhaskar Soriano, PharmD (2019)   Next INR check:   2020   Target end date:   2020    Indications    Subclavian vein thrombosis  right (HCC) [I82.B11]             Anticoagulation Episode Summary     INR check location:       Preferred lab:       Send INR reminders to:       Comments:         Anticoagulation Care Providers     Provider Role Specialty Phone number    [facility] Responsible  [phone]        Anticoagulation Patient Findings      HPI:  Michele Griffin seen in clinic today for follow up on anticoagulation therapy in the presence of subclavian vein thrombosis.   Denies any changes to current medical/health status since last appointment.   Eating a little more greens than normal but will resume his usual diet. Denies any medication  changes.  No current symptoms of bleeding or thrombosis reported.    A/P:   INR therapeutic.   Continue current regimen.   BP recorded in vitals. Diastolic BP elevated. Pt states he drank an energy drink this AM. Will continue to monitor BPs when pt here but encouraged to recheck BP.    Follow up appointment in 2 week(s).    Next Appointment:  at 10:15 am.    Aziza KOLB

## 2020-01-16 ENCOUNTER — ANTICOAGULATION VISIT (OUTPATIENT)
Dept: VASCULAR LAB | Facility: MEDICAL CENTER | Age: 50
End: 2020-01-16
Attending: INTERNAL MEDICINE
Payer: OTHER GOVERNMENT

## 2020-01-16 DIAGNOSIS — I82.B11 SUBCLAVIAN VEIN THROMBOSIS, RIGHT (HCC): ICD-10-CM

## 2020-01-16 LAB — INR PPP: 1.8 (ref 2–3.5)

## 2020-01-16 PROCEDURE — 85610 PROTHROMBIN TIME: CPT

## 2020-01-16 PROCEDURE — 99212 OFFICE O/P EST SF 10 MIN: CPT | Performed by: PHARMACIST

## 2020-01-16 NOTE — PROGRESS NOTES
Anticoagulation Summary  As of 1/16/2020    INR goal:   2.0-3.0   TTR:   81.6 % (1.3 mo)   INR used for dosing:      Plan last modified:   Bhaskar Soriano, PharmD (11/26/2019)   Next INR check:      Target end date:   2/17/2020    Indications    Subclavian vein thrombosis  right (HCC) [I82.B11]             Anticoagulation Episode Summary     INR check location:       Preferred lab:       Send INR reminders to:       Comments:         Anticoagulation Care Providers     Provider Role Specialty Phone number    [facility] Responsible  [phone]        Anticoagulation Patient Findings    HPI:  Michele Griffin seen in clinic today, on anticoagulation therapy with warfarin for subclavian vein thrombosis.  Changes to current medical/health status since last appt: none  Denies signs/symptoms of bleeding and/or thrombosis since the last appt.    Pt green intake has increased.   Pt pulled a muscle in his back and had some extra prednisone. Pt took 40 mg x 1, 30 mg x 1, 20 mg x 1,10 mg x1. 10 mg was taken this morning.   Denies any complications or cost restrictions with current therapy.   Verified current warfarin dosing schedule.  Pt denied vitals.    A/P   INR  SUB-therapeutic.     Increase weekly dose by 7%.   Pt leaving the country on 1[place]30. Next INR 1/29.   Per Pt, PCP wants 6 months of therapy and will re-evaluate then.     Jase Balderrama, Pharmacy Student    Thu Kiser, PharmD     03/20/2020 ADDENDUM: CHARGES REVIEWED.  Angeli Banks, Clinical Pharmacist, CDE, CACP

## 2020-01-17 LAB — INR BLD: 1.8 (ref 0.9–1.2)

## 2020-01-29 ENCOUNTER — ANTICOAGULATION VISIT (OUTPATIENT)
Dept: VASCULAR LAB | Facility: MEDICAL CENTER | Age: 50
End: 2020-01-29
Attending: INTERNAL MEDICINE
Payer: OTHER GOVERNMENT

## 2020-01-29 VITALS — HEART RATE: 80 BPM | DIASTOLIC BLOOD PRESSURE: 99 MMHG | SYSTOLIC BLOOD PRESSURE: 144 MMHG

## 2020-01-29 DIAGNOSIS — I82.B11 SUBCLAVIAN VEIN THROMBOSIS, RIGHT (HCC): ICD-10-CM

## 2020-01-29 LAB — INR PPP: 1.8 (ref 2–3.5)

## 2020-01-29 PROCEDURE — 85610 PROTHROMBIN TIME: CPT

## 2020-01-29 PROCEDURE — 99212 OFFICE O/P EST SF 10 MIN: CPT | Performed by: NURSE PRACTITIONER

## 2020-01-29 NOTE — PROGRESS NOTES
Anticoagulation Summary  As of 2020    INR goal:   2.0-3.0   TTR:   54.9 % (2.2 mo)   INR used for dosin.80! (2020)   Warfarin maintenance plan:   7.5 mg (5 mg x 1.5) every Thu; 5 mg (5 mg x 1) all other days   Weekly warfarin total:   37.5 mg   Plan last modified:   Thu Kiser, PharmD (2020)   Next INR check:      Target end date:   2020    Indications    Subclavian vein thrombosis  right (HCC) [I82.B11]             Anticoagulation Episode Summary     INR check location:       Preferred lab:       Send INR reminders to:       Comments:   : Per PCP      Anticoagulation Care Providers     Provider Role Specialty Phone number    [facility] Responsible  [phone]        Anticoagulation Patient Findings      HPI:  Michele Griffin seen in clinic today for follow up on anticoagulation therapy in the presence of subclavian vein thrombosis hx.   Denies any changes to current medical/health status since last appointment.   Denies any medication or diet changes.   No current symptoms of bleeding or thrombosis reported.  Confirmed dose. No missed doses.   Pt leaving on a 3 week sailing trip tomorrow. He will not have access to any labs. Normally he eats a diet very high in vit k (kale rich diet), however, he will be eating less than normal while sailing.    A/P:   INR subtherapeutic despite a dose increase last visit. Thrombosis in 2019, so within 90 days.   INR low last visit. We talked about Lovenox as an option. He has two syringes at home. He will injection one today and one tomorrow then stop.    Will have pt give one time dose increase tonight then continue current regimen. He knows to monitor extra closely for any s/sx of bleeding or recurrent VTE. His traveling companions are aware he is taking a blood thinner.  BP recorded in vitals.    Follow up appointment in 3 week(s) as this is the soonest he can return.    Next Appointment: Wednesday, ,  2020 at 10:30 am.    Aziza KOLB    Addendum: After patient visit, I reviewed his notes and his clot was in Oct 11, 2019 - I call and left him a VM to NOT inject Lovenox but continue warfarin dosing as instructed.

## 2020-01-30 LAB — INR BLD: 1.8 (ref 0.9–1.2)

## 2020-02-19 ENCOUNTER — ANTICOAGULATION VISIT (OUTPATIENT)
Dept: VASCULAR LAB | Facility: MEDICAL CENTER | Age: 50
End: 2020-02-19
Attending: INTERNAL MEDICINE
Payer: OTHER GOVERNMENT

## 2020-02-19 VITALS — DIASTOLIC BLOOD PRESSURE: 87 MMHG | SYSTOLIC BLOOD PRESSURE: 137 MMHG | HEART RATE: 74 BPM

## 2020-02-19 DIAGNOSIS — I82.B11 SUBCLAVIAN VEIN THROMBOSIS, RIGHT (HCC): ICD-10-CM

## 2020-02-19 LAB — INR PPP: 1.4 (ref 2–3.5)

## 2020-02-19 PROCEDURE — 85610 PROTHROMBIN TIME: CPT

## 2020-02-19 PROCEDURE — 99212 OFFICE O/P EST SF 10 MIN: CPT | Performed by: NURSE PRACTITIONER

## 2020-02-19 NOTE — PROGRESS NOTES
Anticoagulation Summary  As of 2020    INR goal:   2.0-3.0   TTR:   41.5 % (2.9 mo)   INR used for dosin.40! (2020)   Warfarin maintenance plan:   7.5 mg (5 mg x 1.5) every Mon, Wed, Fri; 5 mg (5 mg x 1) all other days   Weekly warfarin total:   42.5 mg   Plan last modified:   Aziza Suarez, A.P.N. (2020)   Next INR check:   3/9/2020   Target end date:   2020    Indications    Subclavian vein thrombosis  right (HCC) [I82.B11]             Anticoagulation Episode Summary     INR check location:       Preferred lab:       Send INR reminders to:       Comments:   : Per PCP      Anticoagulation Care Providers     Provider Role Specialty Phone number    [facility] Responsible  [phone]        Anticoagulation Patient Findings      HPI:  Michele Griffin seen in clinic today for follow up on anticoagulation therapy in the presence of subclavian vein clot, s/p 1st rib resection.   He returned from a sailing trip yesterday. He hasn't missed any doses in the past few days but did miss a couple earlier this month.  Ate less greens than his normal home diet.  Denies any medication changes.   No current symptoms of bleeding or thrombosis reported.    He is leaving tomorrow for [place] for 2 weeks.    A/P:   INR subtherapeutic. VTE > 90 days ago. INR trending low. Will increase regimen by ~13% with a one time loading dose tonight.  Offered to give standing order to have his blood checked in [place] but he declines. Will follow up with pt in 2.5 weeks. He knows to monitor closely for s/sx of recurrent VTE or prolonged bleeding - will seek immediate medical attention.  Continue current regimen.   BP recorded in vitals.    Follow up appointment in 2.5 week(s).    Next Appointment: 2020 at 10:30 am.    Aziza KOLB

## 2020-02-20 LAB — INR BLD: 1.4 (ref 0.9–1.2)

## 2020-03-09 ENCOUNTER — ANTICOAGULATION VISIT (OUTPATIENT)
Dept: VASCULAR LAB | Facility: MEDICAL CENTER | Age: 50
End: 2020-03-09
Attending: INTERNAL MEDICINE
Payer: OTHER GOVERNMENT

## 2020-03-09 VITALS — HEART RATE: 66 BPM | DIASTOLIC BLOOD PRESSURE: 87 MMHG | SYSTOLIC BLOOD PRESSURE: 125 MMHG

## 2020-03-09 DIAGNOSIS — I82.B11 SUBCLAVIAN VEIN THROMBOSIS, RIGHT (HCC): ICD-10-CM

## 2020-03-09 LAB
INR BLD: 2.1 (ref 0.9–1.2)
INR PPP: 2.1 (ref 2–3.5)

## 2020-03-09 PROCEDURE — 85610 PROTHROMBIN TIME: CPT

## 2020-03-09 PROCEDURE — 99211 OFF/OP EST MAY X REQ PHY/QHP: CPT | Performed by: NURSE PRACTITIONER

## 2020-03-09 NOTE — PROGRESS NOTES
Anticoagulation Summary  As of 3/9/2020    INR goal:   2.0-3.0   TTR:   36.6 % (3.5 mo)   INR used for dosin.10 (3/9/2020)   Warfarin maintenance plan:   7.5 mg (5 mg x 1.5) every Mon, Wed, Fri; 5 mg (5 mg x 1) all other days   Weekly warfarin total:   42.5 mg   Plan last modified:   Aziza Suarez, A.P.NScott (2020)   Next INR check:   3/23/2020   Target end date:   2020    Indications    Subclavian vein thrombosis  right (HCC) [I82.B11]             Anticoagulation Episode Summary     INR check location:       Preferred lab:       Send INR reminders to:       Comments:   : Per PCP      Anticoagulation Care Providers     Provider Role Specialty Phone number    [facility] Responsible  [phone]        Anticoagulation Patient Findings      HPI:  Michele Griffin seen in clinic today for follow up on anticoagulation therapy in the presence of subclavian thrombosis.   Denies any changes to current medical/health status since last appointment.   Denies any medication or diet changes.   No current symptoms of bleeding or thrombosis reported.  Confirmed dose. No missed doses.    A/P:   INR therapeutic.   Continue current regimen.   BP recorded in vitals.    Follow up appointment in 2 week(s).    Next Appointment: 2020 at 10:30 am.    Aziza KOLB

## 2020-03-23 ENCOUNTER — ANTICOAGULATION VISIT (OUTPATIENT)
Dept: VASCULAR LAB | Facility: MEDICAL CENTER | Age: 50
End: 2020-03-23
Attending: INTERNAL MEDICINE
Payer: OTHER GOVERNMENT

## 2020-03-23 DIAGNOSIS — I82.B11 SUBCLAVIAN VEIN THROMBOSIS, RIGHT (HCC): ICD-10-CM

## 2020-03-23 LAB
INR BLD: 2.1 (ref 0.9–1.2)
INR PPP: 2.1 (ref 2–3.5)

## 2020-03-23 PROCEDURE — 99211 OFF/OP EST MAY X REQ PHY/QHP: CPT

## 2020-03-23 PROCEDURE — 85610 PROTHROMBIN TIME: CPT

## 2020-03-23 NOTE — PROGRESS NOTES
Anticoagulation Summary  As of 3/23/2020    INR goal:   2.0-3.0   TTR:   44.0 % (4 mo)   INR used for dosin.10 (3/23/2020)   Warfarin maintenance plan:   7.5 mg (5 mg x 1.5) every Mon, Wed, Fri; 5 mg (5 mg x 1) all other days   Weekly warfarin total:   42.5 mg   Plan last modified:   DALJIT Tai (2020)   Next INR check:   2020   Target end date:   2020    Indications    Subclavian vein thrombosis  right (HCC) [I82.B11]             Anticoagulation Episode Summary     INR check location:       Preferred lab:       Send INR reminders to:       Comments:   : Per PCP      Anticoagulation Care Providers     Provider Role Specialty Phone number    [facility] Responsible  [phone]        Anticoagulation Patient Findings      HPI:  Michele Griffin seen in clinic today, on anticoagulation therapy with warfarin for subclavian thrombosis.  Changes to current medical/health status since last appt: none  Denies signs/symptoms of bleeding and/or thrombosis since the last appt.    Denies any interval changes to diet  Denies any interval changes to medications since last appt.   Denies any complications or cost restrictions with current therapy.     A/P   INR is therapeutic.   Pt to continue current warfarin therapy regimen.   Pt to contact clinic for any s/s of unusual bleeding, bruising, clotting, or any changes to diet or medication.     Follow up appointment in 4 week(s).    Danielle Villavicencio, ChaparroD

## 2020-04-20 ENCOUNTER — ANTICOAGULATION VISIT (OUTPATIENT)
Dept: VASCULAR LAB | Facility: MEDICAL CENTER | Age: 50
End: 2020-04-20
Attending: INTERNAL MEDICINE
Payer: OTHER GOVERNMENT

## 2020-04-20 DIAGNOSIS — I82.B11 SUBCLAVIAN VEIN THROMBOSIS, RIGHT (HCC): ICD-10-CM

## 2020-04-20 LAB — INR PPP: 3.1 (ref 2–3.5)

## 2020-04-20 PROCEDURE — 99212 OFFICE O/P EST SF 10 MIN: CPT | Performed by: NURSE PRACTITIONER

## 2020-04-20 PROCEDURE — 85610 PROTHROMBIN TIME: CPT

## 2020-04-20 NOTE — PROGRESS NOTES
Anticoagulation Summary  As of 4/20/2020    INR goal:   2.0-3.0   TTR:   52.8 % (4.9 mo)   INR used for dosing:   3.10! (4/20/2020)   Warfarin maintenance plan:   7.5 mg (5 mg x 1.5) every Mon, Wed, Fri; 5 mg (5 mg x 1) all other days   Weekly warfarin total:   42.5 mg   Plan last modified:   zAiza Suarez, A.P.NScott (2/19/2020)   Next INR check:   5/18/2020   Target end date:   5/17/2020    Indications    Subclavian vein thrombosis  right (HCC) [I82.B11]             Anticoagulation Episode Summary     INR check location:       Preferred lab:       Send INR reminders to:       Comments:   1/16: Per PCP      Anticoagulation Care Providers     Provider Role Specialty Phone number    [facility] Responsible  [phone]        Anticoagulation Patient Findings      HPI:  Michele Griffin seen in clinic today for follow up on anticoagulation therapy in the presence of subclavian VT.   Denies any changes to current medical/health status since last appointment.   Denies any medication or diet changes.   No current symptoms of bleeding or thrombosis reported.    A/P:   INR supratherapeutic.   Decrease tonight then continue current regimen.     Follow up appointment in 4 week(s).    Next Appointment: Monday, May 18, 2020 at 10:30 am.    Aziza KOLB

## 2020-04-27 ENCOUNTER — TELEMEDICINE (OUTPATIENT)
Dept: MEDICAL GROUP | Facility: PHYSICIAN GROUP | Age: 50
End: 2020-04-27
Payer: OTHER GOVERNMENT

## 2020-04-27 VITALS
HEART RATE: 74 BPM | DIASTOLIC BLOOD PRESSURE: 78 MMHG | WEIGHT: 172 LBS | HEIGHT: 72 IN | BODY MASS INDEX: 23.3 KG/M2 | SYSTOLIC BLOOD PRESSURE: 127 MMHG

## 2020-04-27 DIAGNOSIS — E03.4 HYPOTHYROIDISM DUE TO ACQUIRED ATROPHY OF THYROID: ICD-10-CM

## 2020-04-27 DIAGNOSIS — I10 ESSENTIAL HYPERTENSION: ICD-10-CM

## 2020-04-27 DIAGNOSIS — E78.2 MIXED HYPERLIPIDEMIA: ICD-10-CM

## 2020-04-27 DIAGNOSIS — E55.9 VITAMIN D DEFICIENCY: ICD-10-CM

## 2020-04-27 DIAGNOSIS — I82.B11 SUBCLAVIAN VEIN THROMBOSIS, RIGHT (HCC): ICD-10-CM

## 2020-04-27 PROBLEM — M25.562 CHRONIC PAIN OF LEFT KNEE: Status: RESOLVED | Noted: 2019-08-21 | Resolved: 2020-04-27

## 2020-04-27 PROBLEM — G89.29 CHRONIC PAIN OF LEFT KNEE: Status: RESOLVED | Noted: 2019-08-21 | Resolved: 2020-04-27

## 2020-04-27 PROCEDURE — 99214 OFFICE O/P EST MOD 30 MIN: CPT | Mod: 95,CR | Performed by: FAMILY MEDICINE

## 2020-04-27 RX ORDER — LISINOPRIL 10 MG/1
10 TABLET ORAL
COMMUNITY
Start: 2020-03-14 | End: 2020-06-09

## 2020-04-27 SDOH — HEALTH STABILITY: MENTAL HEALTH: HOW OFTEN DO YOU HAVE 6 OR MORE DRINKS ON ONE OCCASION?: NEVER

## 2020-04-27 SDOH — HEALTH STABILITY: MENTAL HEALTH: HOW OFTEN DO YOU HAVE A DRINK CONTAINING ALCOHOL?: 4 OR MORE TIMES A WEEK

## 2020-04-27 SDOH — HEALTH STABILITY: MENTAL HEALTH: HOW MANY STANDARD DRINKS CONTAINING ALCOHOL DO YOU HAVE ON A TYPICAL DAY?: 1 OR 2

## 2020-04-27 ASSESSMENT — FIBROSIS 4 INDEX: FIB4 SCORE: 1.09

## 2020-04-27 NOTE — PROGRESS NOTES
Telemedicine Visit: Established Patient     This encounter was conducted via Zoom .   Verbal consent was obtained. Patient's identity was verified.    Subjective:   CC: Subclavian vein thrombosis  Michele Griffin is a 49 y.o. male presenting for evaluation and management of:    Subclavian vein thrombosis  Chronic medical diagnosis.  He was diagnosed with a right subclavian vein thrombosis in October 2019 at [facility].  On November 12, 2009 he underwent right scalenectomy and right first rib resection at [facility] by Dr. Velasco.  Was initially on Xarelto and currently on warfarin.  Mentions that he is due for repeat sonogram and based on these results will be decided if he needs to continue with the anticoagulation for longer.  Or can discontinue it.  His GI doctor is waiting for these results before proceeding with screening colonoscopy.    Hypertension   Chronic medical diagnosis.  Currently taking lisinopril 10 mg daily.  Blood pressure today is 127/78.  Denies any headaches or chest pain.      Hypothyroid  Chronic medical diagnosis.  Currently taking levothyroxine 100 mcg daily.  Last set of labs from March 2019 had TSH stable at 1.77.    Mixed hyperlipidemia  Chronic medical condition.  Currently not on any statins.  Previous set of labs from November 2019 do have increased total cholesterol 222, HDL 51, triglycerides 132, and .  Does mention that he likes to eat cheese.  Weight has been stable.      Vitamin D deficiency disease  History of.  Currently taking vitamin D 6000 units daily.  Previous set of labs vitamin D level at 45.      ROS   Denies any recent fevers or chills. No nausea or vomiting. No chest pains or shortness of breath.     No Known Allergies    Current medicines (including changes today)  Current Outpatient Medications   Medication Sig Dispense Refill   • lisinopril (PRINIVIL) 10 MG Tab Take 10 mg by mouth every day.     • warfarin (COUMADIN) 5 MG Tab Take 1 Tab by  mouth every day. 90 Tab 1   • DULoxetine (CYMBALTA) 60 MG Cap DR Particles delayed-release capsule Take 60 mg by mouth every day.  6   • valACYclovir (VALTREX) 500 MG Tab TK 1 T PO  BID TAT  3   • Cholecalciferol (VITAMIN D3) 2000 UNIT Cap Take 6,000 Units by mouth every day.     • levothyroxine (SYNTHROID) 100 MCG Tab TAKE 1 TABLET BY MOUTH MONDAY THROUGH SATURDAY. SKIP SUNDAYS 100 Tab 3     No current facility-administered medications for this visit.        Patient Active Problem List    Diagnosis Date Noted   • Subclavian vein thrombosis, right (HCC) 10/16/2019   • Polyarthralgia 05/06/2019   • Arthralgia of both knees 04/04/2019   • Essential hypertension 03/13/2019   • HLA B27 (HLA B27 positive) 03/03/2019   • Cervical strain 11/06/2017   • Foot fracture, right 11/06/2017   • History of x-ray of thoracic spine 11/06/2017   • Recurrent chest pain 01/27/2017   • Recurrent oral herpes simplex 06/10/2016   • Hypothyroidism 06/29/2015   • Vitamin D deficiency disease 04/07/2011   • Mixed hyperlipidemia 05/29/2009       Family History   Problem Relation Age of Onset   • Hypertension Other    • Lung Disease Mother         Pulmonary fibrosis   • Cancer Sister        He  has a past medical history of Herpetic gingivostomatitis, Hyperlipidemia, Hypertension, Inguinal hernia, Substance abuse (HCC), and Thyroid disease.  He  has a past surgical history that includes hernia repair (Right, 10/2009); knee arthroscopy (Right, 11/2015); biopsy general (Right, 04/1985); other surgical procedure (Bilateral, 1973); knee arthroscopy (Left, 9/5/2018); medial meniscectomy (Left, 9/5/2018); and chondroplasty (Left, 9/5/2018).       Objective:   Vitals obtained by patient:  Blood pressure: 127/78, weight 172 pounds.  Pulse 72    Physical Exam:  Constitutional: Alert, no distress, well-groomed.  Skin: No rashes in visible areas.  Eye: Round. Conjunctiva clear, lids normal. No icterus.   ENMT: Lips pink without lesions, good dentition,  moist mucous membranes. Phonation normal.  Neck: No masses, no thyromegaly. Moves freely without pain.  CV: Pulse as reported by patient  Respiratory: Unlabored respiratory effort, no cough or audible wheeze  Psych: Alert and oriented x3, normal affect and mood.       Assessment and Plan:   The following treatment plan was discussed:     1. Subclavian vein thrombosis, right (HCC)  Chronic diagnosis.  Improving.  Will repeat sonogram.  Discussed with patient that GILES Balderrama should have final say on discontinuation of anticoagulation.  - US-EXTREMITY VENOUS UPPER UNILAT RIGHT; Future    2. Essential hypertension  Chronic medical diagnosis.  Stable.  Continue with lisinopril    3. Hypothyroidism due to acquired atrophy of thyroid  Chronic medical diagnosis.  Stable.  Continue with current dose of levothyroxine.  - FREE THYROXINE; Future  - TSH; Future    4. Mixed hyperlipidemia  Chronic medical condition.  Not well controlled.  We will recheck labs.  - Lipid Profile; Future    5. Vitamin D deficiency disease  History of.  Stable.  Continue with vitamin D.  We will recheck labs.  - VITAMIN D,25 HYDROXY; Future          Follow-up: No follow-ups on file.

## 2020-05-08 ENCOUNTER — HOSPITAL ENCOUNTER (OUTPATIENT)
Dept: RADIOLOGY | Facility: MEDICAL CENTER | Age: 50
End: 2020-05-08
Attending: FAMILY MEDICINE
Payer: OTHER GOVERNMENT

## 2020-05-08 DIAGNOSIS — I82.B11 SUBCLAVIAN VEIN THROMBOSIS, RIGHT (HCC): ICD-10-CM

## 2020-05-08 PROCEDURE — 93971 EXTREMITY STUDY: CPT | Mod: 26 | Performed by: INTERNAL MEDICINE

## 2020-05-08 PROCEDURE — 93971 EXTREMITY STUDY: CPT | Mod: RT

## 2020-05-18 ENCOUNTER — ANTICOAGULATION VISIT (OUTPATIENT)
Dept: VASCULAR LAB | Facility: MEDICAL CENTER | Age: 50
End: 2020-05-18
Attending: INTERNAL MEDICINE
Payer: OTHER GOVERNMENT

## 2020-05-18 DIAGNOSIS — I82.B11 SUBCLAVIAN VEIN THROMBOSIS, RIGHT (HCC): ICD-10-CM

## 2020-05-18 LAB — INR PPP: 4.1 (ref 2–3.5)

## 2020-05-18 PROCEDURE — 85610 PROTHROMBIN TIME: CPT

## 2020-05-18 PROCEDURE — 99212 OFFICE O/P EST SF 10 MIN: CPT | Performed by: NURSE PRACTITIONER

## 2020-05-18 NOTE — PROGRESS NOTES
Anticoagulation Summary  As of 2020    INR goal:   2.0-3.0   TTR:   44.3 % (5.8 mo)   INR used for dosin.10! (2020)   Warfarin maintenance plan:   7.5 mg (5 mg x 1.5) every Fri; 5 mg (5 mg x 1) all other days   Weekly warfarin total:   37.5 mg   Plan last modified:   LACI TaiPSLADE (2020)   Next INR check:   2020   Target end date:   2020    Indications    Subclavian vein thrombosis  right (HCC) [I82.B11]             Anticoagulation Episode Summary     INR check location:       Preferred lab:       Send INR reminders to:       Comments:   : Per PCP      Anticoagulation Care Providers     Provider Role Specialty Phone number    [facility] Responsible  [phone]        Anticoagulation Patient Findings      HPI:  Michele Rice Rodrigorm seen in clinic today for follow up on anticoagulation therapy in the presence of subclavian VT.   Denies any changes to current medical/health status since last appointment.   He is eating less greens lately. Denies any medication changes.   No current symptoms of bleeding or thrombosis reported.  He has a virtual visit with his specialist from [facility] about stopping warfarin next week. He will let us know if he can stop warfarin.    A/P:   INR supratherapeutic.   HOLD one dose and began reduced regimen.     Follow up appointment in 2 week(s).    Next Appointment: Monday, 2020 at 10:30 am.    Aziza KOLB

## 2020-06-01 ENCOUNTER — APPOINTMENT (OUTPATIENT)
Dept: VASCULAR LAB | Facility: MEDICAL CENTER | Age: 50
End: 2020-06-01
Payer: OTHER GOVERNMENT

## 2020-06-08 DIAGNOSIS — Z72.0 TOBACCO ABUSE: ICD-10-CM

## 2020-06-08 RX ORDER — BUPROPION HYDROCHLORIDE 150 MG/1
150 TABLET, EXTENDED RELEASE ORAL 2 TIMES DAILY
Qty: 60 TAB | Refills: 3 | Status: SHIPPED | OUTPATIENT
Start: 2020-06-08 | End: 2020-09-02

## 2020-06-08 NOTE — PROGRESS NOTES
Requested Prescriptions     Signed Prescriptions Disp Refills   • buPROPion SR (WELLBUTRIN-SR) 150 MG TABLET SR 12 HR sustained-release tablet 60 Tab 3     Sig: Take 1 Tab by mouth 2 times a day.

## 2020-06-09 RX ORDER — LISINOPRIL 10 MG/1
TABLET ORAL
Qty: 90 TAB | Refills: 3 | Status: SHIPPED | OUTPATIENT
Start: 2020-06-09

## 2020-06-09 NOTE — TELEPHONE ENCOUNTER
Requested Prescriptions     Pending Prescriptions Disp Refills   • lisinopril (PRINIVIL) 10 MG Tab [Pharmacy Med Name: LISINOPRIL 10 MG TABLET] 90 Tab 3     Sig: TAKE 1 TABLET BY MOUTH EVERY DAY   Billie Franklin M.D.

## 2020-06-10 ENCOUNTER — OFFICE VISIT (OUTPATIENT)
Dept: URGENT CARE | Facility: PHYSICIAN GROUP | Age: 50
End: 2020-06-10
Payer: OTHER GOVERNMENT

## 2020-06-10 VITALS
HEIGHT: 72 IN | WEIGHT: 172 LBS | RESPIRATION RATE: 15 BRPM | TEMPERATURE: 98.7 F | OXYGEN SATURATION: 96 % | SYSTOLIC BLOOD PRESSURE: 122 MMHG | DIASTOLIC BLOOD PRESSURE: 90 MMHG | HEART RATE: 82 BPM | BODY MASS INDEX: 23.3 KG/M2

## 2020-06-10 DIAGNOSIS — M70.31 BURSITIS OF RIGHT ELBOW, UNSPECIFIED BURSA: ICD-10-CM

## 2020-06-10 PROCEDURE — 99213 OFFICE O/P EST LOW 20 MIN: CPT | Performed by: NURSE PRACTITIONER

## 2020-06-10 ASSESSMENT — PAIN SCALES - GENERAL: PAINLEVEL: 2=MINIMAL-SLIGHT

## 2020-06-10 ASSESSMENT — ENCOUNTER SYMPTOMS
TINGLING: 0
SENSORY CHANGE: 0
FEVER: 0
CHILLS: 0

## 2020-06-10 ASSESSMENT — FIBROSIS 4 INDEX: FIB4 SCORE: 1.09

## 2020-06-10 NOTE — PROGRESS NOTES
Subjective:      Michele Griffin is a 49 y.o. male who presents with Elbow Pain (Right)            HPI New. 49 year old male with swelling to right elbow for several weeks. Per his report he has drained this twice already ( I highly discouraged this). No pain and no trauma. Would like it drained here.  Patient has no known allergies.  Current Outpatient Medications on File Prior to Visit   Medication Sig Dispense Refill   • lisinopril (PRINIVIL) 10 MG Tab TAKE 1 TABLET BY MOUTH EVERY DAY 90 Tab 3   • buPROPion SR (WELLBUTRIN-SR) 150 MG TABLET SR 12 HR sustained-release tablet Take 1 Tab by mouth 2 times a day. 60 Tab 3   • DULoxetine (CYMBALTA) 60 MG Cap DR Particles delayed-release capsule Take 60 mg by mouth every day.  6   • valACYclovir (VALTREX) 500 MG Tab TK 1 T PO  BID TAT  3   • Cholecalciferol (VITAMIN D3) 2000 UNIT Cap Take 6,000 Units by mouth every day.     • levothyroxine (SYNTHROID) 100 MCG Tab TAKE 1 TABLET BY MOUTH MONDAY THROUGH SATURDAY. SKIP SUNDAYS 100 Tab 3   • warfarin (COUMADIN) 5 MG Tab Take 1 Tab by mouth every day. (Patient not taking: Reported on 6/10/2020) 90 Tab 1     No current facility-administered medications on file prior to visit.      Social History     Socioeconomic History   • Marital status:      Spouse name: Not on file   • Number of children: Not on file   • Years of education: Not on file   • Highest education level: Not on file   Occupational History   • Not on file   Social Needs   • Financial resource strain: Not on file   • Food insecurity     Worry: Not on file     Inability: Not on file   • Transportation needs     Medical: Not on file     Non-medical: Not on file   Tobacco Use   • Smoking status: Former Smoker     Packs/day: 0.00     Years: 30.00     Pack years: 0.00     Types: Cigarettes     Last attempt to quit: 3/28/2013     Years since quittin.2   • Smokeless tobacco: Never Used   • Tobacco comment: 2-3 Per day   Substance and Sexual Activity   •  Alcohol use: Yes     Alcohol/week: 8.4 oz     Types: 14 Glasses of wine per week     Frequency: 4 or more times a week     Drinks per session: 1 or 2     Binge frequency: Never     Comment: 2-3 per day   • Drug use: No   • Sexual activity: Yes     Partners: Female     Comment: , jason alfaro, 4 yr son   Lifestyle   • Physical activity     Days per week: Not on file     Minutes per session: Not on file   • Stress: Not on file   Relationships   • Social connections     Talks on phone: Not on file     Gets together: Not on file     Attends Buddhist service: Not on file     Active member of club or organization: Not on file     Attends meetings of clubs or organizations: Not on file     Relationship status: Not on file   • Intimate partner violence     Fear of current or ex partner: Not on file     Emotionally abused: Not on file     Physically abused: Not on file     Forced sexual activity: Not on file   Other Topics Concern   • Not on file   Social History Narrative   • Not on file     Breast Cancer-related family history is not on file.      Review of Systems   Constitutional: Negative for chills and fever.   Musculoskeletal:        +joint swelling.   Neurological: Negative for tingling and sensory change.          Objective:     /90   Pulse 82   Temp 37.1 °C (98.7 °F)   Resp 15   Ht 1.829 m (6')   Wt 78 kg (172 lb)   SpO2 96%   BMI 23.33 kg/m²      Physical Exam  Constitutional:       Appearance: Normal appearance. He is not ill-appearing.   Cardiovascular:      Rate and Rhythm: Normal rate and regular rhythm.      Heart sounds: No murmur.   Pulmonary:      Effort: Pulmonary effort is normal.      Breath sounds: Normal breath sounds.   Musculoskeletal:      Comments: +swelling of right elbow, tense. No erythema noted and no warmth.   Skin:     General: Skin is warm.   Neurological:      General: No focal deficit present.      Mental Status: He is alert and oriented to person, place, and time.    Psychiatric:         Mood and Affect: Mood normal.                 Assessment/Plan:       1. Bursitis of right elbow, unspecified bursa       Patient advised that I do not do joint aspirations and offered referral to sports med or ortho. [facility].

## 2020-06-23 ENCOUNTER — OCCUPATIONAL MEDICINE (OUTPATIENT)
Dept: URGENT CARE | Facility: CLINIC | Age: 50
End: 2020-06-23
Payer: COMMERCIAL

## 2020-06-23 VITALS
OXYGEN SATURATION: 97 % | RESPIRATION RATE: 16 BRPM | TEMPERATURE: 99 F | BODY MASS INDEX: 23.46 KG/M2 | SYSTOLIC BLOOD PRESSURE: 126 MMHG | DIASTOLIC BLOOD PRESSURE: 88 MMHG | HEART RATE: 78 BPM | HEIGHT: 72 IN | WEIGHT: 173.2 LBS

## 2020-06-23 DIAGNOSIS — S81.812A LACERATION OF LEFT LOWER LEG, INITIAL ENCOUNTER: ICD-10-CM

## 2020-06-23 PROCEDURE — 12002 RPR S/N/AX/GEN/TRNK2.6-7.5CM: CPT | Performed by: FAMILY MEDICINE

## 2020-06-23 RX ORDER — CEPHALEXIN 500 MG/1
500 CAPSULE ORAL 3 TIMES DAILY
Qty: 21 CAP | Refills: 0 | Status: SHIPPED | OUTPATIENT
Start: 2020-06-23 | End: 2020-06-30

## 2020-06-23 ASSESSMENT — FIBROSIS 4 INDEX: FIB4 SCORE: 1.09

## 2020-06-23 ASSESSMENT — PAIN SCALES - GENERAL: PAINLEVEL: 4=SLIGHT-MODERATE PAIN

## 2020-06-23 NOTE — LETTER
EMPLOYEE’S CLAIM FOR COMPENSATION/ REPORT OF INITIAL TREATMENT  FORM C-4    EMPLOYEE’S CLAIM - PROVIDE ALL INFORMATION REQUESTED   First Name  Michele Last Name  Gaby Birthdate                    1970                Sex  male Claim Number   Home Address  [address] [address] Age  49 y.o. 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 (if applicable)  Installing metal panel    How did this injury or occupational disease occur? (Be specific an answer in detail. Use additional sheet if necessary)  cut my leg on upper roof while installing lower panel   If you believe that you have an occupational disease, when did you first have knowledge of the disability and it relationship to your employment?   Witnesses to the Accident  Sylvester Moran      Nature of Injury or Occupational Disease  Laceration  Part(s) of Body Injured or Affected  Lower Leg (L), ,     I certify that the above is true and correct to the best of my knowledge and that I have provided this information in order to obtain the benefits of [place] inclusive or [place]  I hereby authorize any physician, chiropractor, surgeon, practitioner, or other person, any hospital, including [facility] hospital, any medical service organization, any insurance company, or other institution or organization to release to each other, any medical or other information, including benefits paid or payable, pertinent to this injury or disease, except information relative to diagnosis, treatment and/or counseling for AIDS, psychological conditions, alcohol or controlled substances, for which I must give specific authorization.  A Photostat of this authorization shall be as valid as the original.     Date   Place   Employee’s Signature   THIS REPORT MUST BE COMPLETED AND MAILED WITHIN 3 WORKING DAYS OF TREATMENT   Place  [facility]  Name of [facility]   Date  6/23/2020 Diagnosis  (S81.812A) Laceration of left lower leg, initial encounter Is there evidence the injured employee was under the influence of alcohol and/or another controlled substance at the time of accident?   Hour  4:11 PM Description of Injury or Disease  The encounter diagnosis was Laceration of left lower leg, initial encounter. No   Treatment  Wound closed with 4-0 Ethilon, continuous running suture x 15 + 1 interrupted suture. Cephalexin antibiotic prescribed to prevent infection. May use over-the-counter Ibuprofen and/or Tylenol if needed for pain. Antibiotic ointment and dressing applied. He will continue with daily changes. Keep wound dry for 3 days.  Have you advised the patient to remain off work five days or more? No   X-Ray Findings      If Yes   From Date  To Date      From information given by the employee, together with medical evidence, can you directly connect this injury or occupational disease as job incurred?  Yes If No Full Duty Yes Modified Duty      Is additional medical care by a physician indicated?  Yes  Comments:Follow-up  "7/3/2020 or sooner if needed. If Modified Duty, Specify any Limitations / Restrictions  Keep wound covered at work.   Do you know of any previous injury or disease contributing to this condition or occupational disease?                            No   Date  6/23/2020 Print Doctor’s Name Kole Cole M.D. I certify the employer’s copy of  this form was mailed on:   Address  [address] [place] [address] [address] Insurer’s Use Only     [facility]  [address]    Provider’s Tax ID Number  [id number] Telephone  Dept: [phone]        e-SignCHEN, KOLE KANG M.D.   e-Signature: Dr. Guillermo Champion,   Medical Director Degree  MD        ORIGINAL-TREATING PHYSICIAN OR CHIROPRACTOR    PAGE 2-INSURER/TPA    PAGE 3-EMPLOYER    PAGE 4-EMPLOYEE             Form C-4 (rev.10/07)              BRIEF DESCRIPTION OF RIGHTS AND BENEFITS  (Pursuant to [place]RS 616C.050)    Notice of Injury or Occupational Disease (Incident Report Form C-1): If an injury or occupational disease (OD) arises out of and in the course of employment, you must provide written notice to your employer as soon as practicable, but no later than 7 days after the accident or OD. Your employer shall maintain a sufficient supply of the required forms.    Claim for Compensation (Form C-4): If medical treatment is sought, the form C-4 is available at the place of initial treatment. A completed \"Claim for Compensation\" (Form C-4) must be filed within 90 days after an accident or OD. The treating physician or chiropractor must, within 3 working days after treatment, complete and mail to the employer, the employer's insurer and third-party , the Claim for Compensation.    Medical Treatment: If you require medical treatment for your on-the-job injury or OD, you may be required to select a physician or chiropractor from a list provided by your workers’ compensation insurer, if it has contracted with an Organization for Managed Care (MCO) or Preferred Provider " Organization (PPO) or providers of health care. If your employer has not entered into a contract with an MCO or PPO, you may select a physician or chiropractor from the Panel of Physicians and Chiropractors. Any medical costs related to your industrial injury or OD will be paid by your insurer.    Temporary Total Disability (TTD): If your doctor has certified that you are unable to work for a period of at least 5 consecutive days, or 5 cumulative days in a 20-day period, or places restrictions on you that your employer does not accommodate, you may be entitled to TTD compensation.    Temporary Partial Disability (TPD): If the wage you receive upon reemployment is less than the compensation for TTD to which you are entitled, the insurer may be required to pay you TPD compensation to make up the difference. TPD can only be paid for a maximum of 24 months.    Permanent Partial Disability (PPD): When your medical condition is stable and there is an indication of a PPD as a result of your injury or OD, within 30 days, your insurer must arrange for an evaluation by a rating physician or chiropractor to determine the degree of your PPD. The amount of your PPD award depends on the date of injury, the results of the PPD evaluation and your age and wage.    Permanent Total Disability (PTD): If you are medically certified by a treating physician or chiropractor as permanently and totally disabled and have been granted a PTD status by your insurer, you are entitled to receive monthly benefits not to exceed 66 2/3% of your average monthly wage. The amount of your PTD payments is subject to reduction if you previously received a PPD award.    Vocational Rehabilitation Services: You may be eligible for vocational rehabilitation services if you are unable to return to the job due to a permanent physical impairment or permanent restrictions as a result of your injury or occupational disease.    Transportation and Per Dionna  Reimbursement: You may be eligible for travel expenses and per alin associated with medical treatment.    Reopening: You may be able to reopen your claim if your condition worsens after claim closure.    Appeal Process: If you disagree with a written determination issued by the insurer or the insurer does not respond to your request, you may appeal to the Department of Administration, , by following the instructions contained in your determination letter. You must appeal the determination within 70 days from the date of the determination letter at [address], [address], [place], [place] [address], or [address], [address], [place], [place] [address]. If you disagree with the  decision, you may appeal to the Department of Administration, . You must file your appeal within 30 days from the date of the  decision letter at [address], [address], [place], [place] [address], or [address] [facility] [address], [place], [place] [address]. If you disagree with a decision of an , you may file a petition for judicial review with the District Court. You must do so within 30 days of the Appeal Officer’s decision. You may be represented by an  at your own expense or you may contact the [facility] for possible representation.    [place]  for Injured Workers (NAIW): If you disagree with a  decision, you may request that NAIW represent you without charge at an  Hearing. For information regarding denial of benefits, you may contact the [facility] at: [address], [address], [place] [address], [phone], or [address], [facility] [address], [place] [address], [phone]    To File a Complaint with the Division: If you wish to file a complaint with the  of the Division of Industrial Relations (DIR),  please contact the Workers’ Compensation Section, [address] [place] [address]  [place], [address], [place], [place] [address], telephone [phone], or [address], [address], [place], [place] [address], telephone [phone].    For assistance with Workers’ Compensation Issues: You may contact the Office of the Governor Consumer Health Assistance, [address] [place] [address], [address], [place], [place] [address], Toll Free 1-615.536.3867, Web site: http://govcha.[facility].nv., E-mail [email or web address]                   __________________________________________________________________                                                     _________        Employee Name / Signature                                                                                                                                              Date                                                                                                                                                                                                     D-2 (rev. 06/18)

## 2020-06-23 NOTE — LETTER
[address] [place] [address]  Phone:  [phone] - Fax:  [phone]   [facility] Progress Report and Disability Certification  Date of Service: 6/23/2020   No Show:  No  Date / Time of Next Visit: 7/3/2020 @ 10:00AM   Claim Information   Patient Name: Michele Griffin  Claim Number:     Employer:    Date of Injury: 6/23/2020     Insurer / TPA: [employer] [place]  ID / SSN: [id number]   Occupation: /Owner  Diagnosis: The encounter diagnosis was Laceration of left lower leg, initial encounter.    Medical Information   Related to Industrial Injury? Yes    Subjective Complaints:  DOI: 6/23/2020. Sustained laceration to left lower leg while installing a panel. No foreign body in wound. Last tetanus immunization 6/8/15 per [employer].   Objective Findings: Left lower leg anterolaterally: 6 mm linear laceration gaping wide open.   Pre-Existing Condition(s): None.   Assessment:   Initial Visit    Status: Additional Care Required  Comments:Follow-up 7/3/2020 or sooner if needed.  Permanent Disability:No    Plan: Medication  Comments:Cephalexin antibiotic prescribed to prevent infection. May use over-the-counter Ibuprofen and/or Tylenol if needed for pain.    Diagnostics:      Comments:  Wound closed with 4-0 Ethilon, continuous running suture x 15 + 1 interrupted suture. Antibiotic ointment and dressing applied. He will continue with daily changes. Keep wound dry for 3 days.    Disability Information   Status: Released to Full Duty    From:  6/16/2020  Through: 7/3/2020 Restrictions are:     Physical Restrictions   Sitting:    Standing:    Stooping:    Bending:      Squatting:    Walking:    Climbing:    Pushing:      Pulling:    Other:    Reaching Above Shoulder (L):   Reaching Above Shoulder (R):       Reaching Below Shoulder (L):    Reaching Below Shoulder (R):      Not to exceed Weight Limits   Carrying(hrs):   Weight Limit(lb):   Lifting(hrs):    Weight  Limit(lb):     Comments: Keep wound covered at work.    Repetitive Actions   Hands: i.e. Fine Manipulations from Grasping:     Feet: i.e. Operating Foot Controls:     Driving / Operate Machinery:     Physician Name: Kole Cole M.D. Physician Signature: KOLE Ernandez M.D. e-Signature: Dr. Guillermo Champion, Medical Director   Clinic Name / Location: [address] [place] [address] Clinic Phone Number: Dept: [phone]   Appointment Time: 3:30 Pm Visit Start Time: 4:11 PM   Check-In Time:  3:32 Pm Visit Discharge Time:  6:20 PM   Original-Treating Physician or Chiropractor    Page 2-Insurer/TPA    Page 3-Employer    Page 4-Employee

## 2020-06-23 NOTE — PROGRESS NOTES
Chief Complaint:    Chief Complaint   Patient presents with   • Work-Related Injury     laceration of leg. happened around 1:30       History of Present Illness:    DOI: 6/23/2020. Sustained laceration to left lower leg while installing a panel. No foreign body in wound. Last tetanus immunization 6/8/15 per [employer].      Review of Systems:    Constitutional: Negative for fever, chills, and diaphoresis.   Eyes: Negative for change in vision, photophobia, pain, redness, and discharge.  ENT: Negative for ear pain, ear discharge, hearing loss, tinnitus, nasal congestion, nosebleeds, and sore throat.    Respiratory: Negative for shortness of breath.    Cardiovascular: Negative for chest pain.   Gastrointestinal: Negative for abdominal pain.   Genitourinary: Negative for dysuria.   Musculoskeletal: See HPI.   Skin: See HPI.   Neurological: Negative for dizziness, tingling, and focal weakness.   Heme: Does not bruise/bleed easily.   Psychiatric/Behavioral: No new symptoms.      Past Medical History:    Past Medical History:   Diagnosis Date   • Herpetic gingivostomatitis    • Hyperlipidemia    • Hypertension    • Inguinal hernia    • Substance abuse (HCC)    • Thyroid disease      Past Surgical History:    Past Surgical History:   Procedure Laterality Date   • KNEE ARTHROSCOPY Left 9/5/2018    Procedure: KNEE ARTHROSCOPY;  Surgeon: Shaun Colbert M.D.;  Location: [facility];  Service: Orthopedics   • MEDIAL MENISCECTOMY Left 9/5/2018    Procedure: MEDIAL MENISCECTOMY - PARTIAL and partial lateral menisectomy;  Surgeon: Shaun Colbert M.D.;  Location: [facility];  Service: Orthopedics   • CHONDROPLASTY Left 9/5/2018    Procedure: CHONDROPLASTY - AND PHILLIP;  Surgeon: Shaun Colbert M.D.;  Location: [facility];  Service: Orthopedics   • KNEE ARTHROSCOPY Right 11/2015    Dr oClbert  Meniscus Repair and Clean Out   • HERNIA REPAIR Right 10/2009    right sided   • BIOPSY GENERAL Right  "1985    from cat bite, enlarged lymph node   • OTHER SURGICAL PROCEDURE Bilateral 1973    bilateral ear \"pinned back\"     Social History:    Social History     Socioeconomic History   • Marital status:      Spouse name: Not on file   • Number of children: Not on file   • Years of education: Not on file   • Highest education level: Not on file   Occupational History   • Not on file   Social Needs   • Financial resource strain: Not on file   • Food insecurity     Worry: Not on file     Inability: Not on file   • Transportation needs     Medical: Not on file     Non-medical: Not on file   Tobacco Use   • Smoking status: Former Smoker     Packs/day: 0.00     Years: 30.00     Pack years: 0.00     Types: Cigarettes     Last attempt to quit: 3/28/2013     Years since quittin.2   • Smokeless tobacco: Never Used   • Tobacco comment: 2-3 Per day   Substance and Sexual Activity   • Alcohol use: Yes     Alcohol/week: 8.4 oz     Types: 14 Glasses of wine per week     Frequency: 4 or more times a week     Drinks per session: 1 or 2     Binge frequency: Never     Comment: 2-3 per day   • Drug use: No   • Sexual activity: Yes     Partners: Female     Comment: , jason alfaro, 4 yr son   Lifestyle   • Physical activity     Days per week: Not on file     Minutes per session: Not on file   • Stress: Not on file   Relationships   • Social connections     Talks on phone: Not on file     Gets together: Not on file     Attends Latter day service: Not on file     Active member of club or organization: Not on file     Attends meetings of clubs or organizations: Not on file     Relationship status: Not on file   • Intimate partner violence     Fear of current or ex partner: Not on file     Emotionally abused: Not on file     Physically abused: Not on file     Forced sexual activity: Not on file   Other Topics Concern   • Not on file   Social History Narrative   • Not on file     Family History:    Family History "   Problem Relation Age of Onset   • Hypertension Other    • Lung Disease Mother         Pulmonary fibrosis   • Cancer Sister      Medications:    Current Outpatient Medications on File Prior to Visit   Medication Sig Dispense Refill   • lisinopril (PRINIVIL) 10 MG Tab TAKE 1 TABLET BY MOUTH EVERY DAY 90 Tab 3   • buPROPion SR (WELLBUTRIN-SR) 150 MG TABLET SR 12 HR sustained-release tablet Take 1 Tab by mouth 2 times a day. 60 Tab 3   • DULoxetine (CYMBALTA) 60 MG Cap DR Particles delayed-release capsule Take 60 mg by mouth every day.  6   • valACYclovir (VALTREX) 500 MG Tab TK 1 T PO  BID TAT  3   • Cholecalciferol (VITAMIN D3) 2000 UNIT Cap Take 6,000 Units by mouth every day.     • levothyroxine (SYNTHROID) 100 MCG Tab TAKE 1 TABLET BY MOUTH MONDAY THROUGH SATURDAY. SKIP SUNDAYS 100 Tab 3     No current facility-administered medications on file prior to visit.      Allergies:    No Known Allergies      Vitals:    Vitals:    06/23/20 1616   BP: 126/88   Pulse: 78   Resp: 16   Temp: 37.2 °C (99 °F)   TempSrc: Temporal   SpO2: 97%   Weight: 78.6 kg (173 lb 3.2 oz)   Height: 1.829 m (6')       Physical Exam:    Constitutional: Vital signs reviewed. Appears well-developed and well-nourished. No acute distress.   Eyes: Sclera white, conjunctivae clear.  ENT: External ears normal. Hearing normal.  Cardiovascular: Peripheral pulses 2+. Normal cap refill, < 2 seconds.  Pulmonary/Chest: Respirations non-labored.  Musculoskeletal: Normal gait. Normal range of motion. No muscular atrophy or weakness.  Neurological: Alert and oriented to person, place, and time. Muscle tone normal. Coordination normal. Light touch and sensation normal.  Skin: Left lower leg anterolaterally: 6 mm linear laceration gaping wide open.  Psychiatric: Normal mood and affect. Behavior is normal. Judgment and thought content normal.       Assessment / Plan:    1. Laceration of left lower leg, initial encounter      Work status: Full duty. Keep wound  covered at work.    Wound cleansed with saline, Hibiclens, and Betadine.    Agreeable to sutures.    After informed verbal consent, local anesthesia with 2% Lidocaine.    Wound closed with 4-0 Ethilon, continuous running suture x 15 + 1 interrupted suture.    Antibiotic ointment and dressing applied. He will continue with daily changes. Keep wound dry for 3 days.    Local wound care and signs/symptoms of infection discussed.    Cephalexin antibiotic prescribed to prevent infection.    May use over-the-counter Ibuprofen and/or Tylenol if needed for pain.    Follow-up 7/3/2020 or sooner if needed.

## 2020-07-15 ENCOUNTER — ANTICOAGULATION MONITORING (OUTPATIENT)
Dept: VASCULAR LAB | Facility: MEDICAL CENTER | Age: 50
End: 2020-07-15

## 2020-07-15 DIAGNOSIS — I82.B11 SUBCLAVIAN VEIN THROMBOSIS, RIGHT (HCC): ICD-10-CM

## 2020-07-15 NOTE — PROGRESS NOTES
Per Dr Reyes @ [facility], patient discontinued anticoagulation following 5-22-20 appt.  Pending further contact, will discharge from anticoagulation clinic.  Kamlesh Joseph, PharmD, BCACP    CC  Dr Michael Bloch

## 2020-09-01 DIAGNOSIS — Z72.0 TOBACCO ABUSE: ICD-10-CM

## 2020-09-02 RX ORDER — BUPROPION HYDROCHLORIDE 150 MG/1
TABLET, EXTENDED RELEASE ORAL
Qty: 180 TAB | Refills: 2 | Status: SHIPPED | OUTPATIENT
Start: 2020-09-02

## 2020-09-02 NOTE — TELEPHONE ENCOUNTER
Requested Prescriptions     Pending Prescriptions Disp Refills   • buPROPion SR (WELLBUTRIN-SR) 150 MG TABLET SR 12 HR sustained-release tablet [Pharmacy Med Name: BUPROPION HCL  MG TABLET] 180 Tab 2     Sig: TAKE 1 TABLET BY MOUTH TWICE A DAY   Billie Franklin M.D.

## 2020-10-12 DIAGNOSIS — R06.83 SNORING: ICD-10-CM

## 2021-03-31 ENCOUNTER — IMMUNIZATION (OUTPATIENT)
Dept: FAMILY PLANNING/WOMEN'S HEALTH CLINIC | Facility: IMMUNIZATION CENTER | Age: 51
End: 2021-03-31
Payer: OTHER GOVERNMENT

## 2021-03-31 DIAGNOSIS — Z23 ENCOUNTER FOR VACCINATION: Primary | ICD-10-CM

## 2021-03-31 PROCEDURE — 0001A PFIZER SARS-COV-2 VACCINE: CPT | Performed by: INTERNAL MEDICINE

## 2021-03-31 PROCEDURE — 91300 PFIZER SARS-COV-2 VACCINE: CPT | Performed by: INTERNAL MEDICINE

## 2021-04-22 ENCOUNTER — IMMUNIZATION (OUTPATIENT)
Dept: FAMILY PLANNING/WOMEN'S HEALTH CLINIC | Facility: IMMUNIZATION CENTER | Age: 51
End: 2021-04-22
Attending: INTERNAL MEDICINE
Payer: OTHER GOVERNMENT

## 2021-04-22 DIAGNOSIS — Z23 ENCOUNTER FOR VACCINATION: Primary | ICD-10-CM

## 2021-04-22 PROCEDURE — 0002A PFIZER SARS-COV-2 VACCINE: CPT

## 2021-04-22 PROCEDURE — 91300 PFIZER SARS-COV-2 VACCINE: CPT

## 2021-10-27 ENCOUNTER — APPOINTMENT (RX ONLY)
Dept: URBAN - METROPOLITAN AREA CLINIC 4 | Facility: CLINIC | Age: 51
Setting detail: DERMATOLOGY
End: 2021-10-27

## 2021-10-27 DIAGNOSIS — L82.1 OTHER SEBORRHEIC KERATOSIS: ICD-10-CM

## 2021-10-27 DIAGNOSIS — D22 MELANOCYTIC NEVI: ICD-10-CM

## 2021-10-27 DIAGNOSIS — L81.4 OTHER MELANIN HYPERPIGMENTATION: ICD-10-CM

## 2021-10-27 DIAGNOSIS — D18.0 HEMANGIOMA: ICD-10-CM

## 2021-10-27 DIAGNOSIS — Z71.89 OTHER SPECIFIED COUNSELING: ICD-10-CM

## 2021-10-27 PROBLEM — D48.5 NEOPLASM OF UNCERTAIN BEHAVIOR OF SKIN: Status: ACTIVE | Noted: 2021-10-27

## 2021-10-27 PROBLEM — D22.5 MELANOCYTIC NEVI OF TRUNK: Status: ACTIVE | Noted: 2021-10-27

## 2021-10-27 PROBLEM — D18.01 HEMANGIOMA OF SKIN AND SUBCUTANEOUS TISSUE: Status: ACTIVE | Noted: 2021-10-27

## 2021-10-27 PROCEDURE — 11103 TANGNTL BX SKIN EA SEP/ADDL: CPT

## 2021-10-27 PROCEDURE — ? SEPARATE AND IDENTIFIABLE DOCUMENTATION

## 2021-10-27 PROCEDURE — 99204 OFFICE O/P NEW MOD 45 MIN: CPT | Mod: 25

## 2021-10-27 PROCEDURE — ? COUNSELING

## 2021-10-27 PROCEDURE — ? BIOPSY BY SHAVE METHOD

## 2021-10-27 PROCEDURE — ? SURGICAL DECISION MAKING

## 2021-10-27 PROCEDURE — 11102 TANGNTL BX SKIN SINGLE LES: CPT

## 2021-10-27 ASSESSMENT — LOCATION DETAILED DESCRIPTION DERM
LOCATION DETAILED: LEFT INFERIOR LATERAL UPPER BACK
LOCATION DETAILED: LEFT INFERIOR UPPER BACK
LOCATION DETAILED: RIGHT SUPERIOR MEDIAL UPPER BACK
LOCATION DETAILED: RIGHT INFERIOR LATERAL UPPER BACK
LOCATION DETAILED: RIGHT INFERIOR MEDIAL UPPER BACK
LOCATION DETAILED: RIGHT SUPERIOR UPPER BACK
LOCATION DETAILED: RIGHT INFERIOR MEDIAL MIDBACK
LOCATION DETAILED: LEFT ANTERIOR PROXIMAL UPPER ARM
LOCATION DETAILED: LEFT SUPERIOR MEDIAL LOWER BACK
LOCATION DETAILED: RIGHT MID-UPPER BACK
LOCATION DETAILED: LEFT MEDIAL UPPER BACK
LOCATION DETAILED: SUPERIOR LUMBAR SPINE
LOCATION DETAILED: RIGHT ANTERIOR PROXIMAL UPPER ARM
LOCATION DETAILED: RIGHT INFERIOR UPPER BACK

## 2021-10-27 ASSESSMENT — LOCATION SIMPLE DESCRIPTION DERM
LOCATION SIMPLE: RIGHT LOWER BACK
LOCATION SIMPLE: RIGHT UPPER BACK
LOCATION SIMPLE: RIGHT UPPER ARM
LOCATION SIMPLE: LEFT UPPER BACK
LOCATION SIMPLE: LEFT UPPER ARM
LOCATION SIMPLE: LOWER BACK
LOCATION SIMPLE: LEFT LOWER BACK

## 2021-10-27 ASSESSMENT — LOCATION ZONE DERM
LOCATION ZONE: ARM
LOCATION ZONE: TRUNK

## 2021-10-27 NOTE — PROCEDURE: BIOPSY BY SHAVE METHOD
Detail Level: Detailed
Depth Of Biopsy: dermis
Was A Bandage Applied: Yes
Size Of Lesion In Cm: 0
Biopsy Type: H and E
Biopsy Method: Dermablade
Anesthesia Type: 1% lidocaine with epinephrine
Anesthesia Volume In Cc: 0.5
Hemostasis: Drysol
Wound Care: Petrolatum
Dressing: bandage
Destruction After The Procedure: No
Type Of Destruction Used: Curettage
Curettage Text: The wound bed was treated with curettage after the biopsy was performed.
Cryotherapy Text: The wound bed was treated with cryotherapy after the biopsy was performed.
Electrodesiccation Text: The wound bed was treated with electrodesiccation after the biopsy was performed.
Electrodesiccation And Curettage Text: The wound bed was treated with electrodesiccation and curettage after the biopsy was performed.
Silver Nitrate Text: The wound bed was treated with silver nitrate after the biopsy was performed.
Lab: [id number]
Lab Facility: 
Path Notes (To The Dermatopathologist): All three bx were previously noticeable nevi, 1 year ago patient noticed they began to disappear. Halo formation present today.
Consent: Written consent was obtained and risks were reviewed including but not limited to scarring, infection, bleeding, scabbing, incomplete removal, nerve damage and allergy to anesthesia.
Post-Care Instructions: I reviewed with the patient in detail post-care instructions. Patient is to keep the biopsy site dry overnight, and then apply bacitracin twice daily until healed. Patient may apply hydrogen peroxide soaks to remove any crusting.
Notification Instructions: Patient will be notified of biopsy results. However, patient instructed to call the office if not contacted within 2 weeks.
Billing Type: Third-Party Bill
Information: Selecting Yes will display possible errors in your note based on the variables you have selected. This validation is only offered as a suggestion for you. PLEASE NOTE THAT THE VALIDATION TEXT WILL BE REMOVED WHEN YOU FINALIZE YOUR NOTE. IF YOU WANT TO FAX A PRELIMINARY NOTE YOU WILL NEED TO TOGGLE THIS TO 'NO' IF YOU DO NOT WANT IT IN YOUR FAXED NOTE.

## 2021-10-27 NOTE — PROCEDURE: SURGICAL DECISION MAKING
Risk Assessment Explanation (Does Not Render In The Note): Clinical determination of the probability and/or consequences of an event, such as surgery. Clinical assessment of the level of risk is affected by the nature of the event under consideration for the patient. Modifier 57 is used to indicate an Evaluation and Management (E/M) service resulted in the initial decision to perform surgery either the day before a major surgery (90 day global) or the day of a major surgery.
Identified Risk Factors Documented?: yes
Complexity (Necessary For Coding; Major - 90 Day Global With Some Exceptions; Minor - 10 Day Global): major
Discussion: Bx vs treat

## 2021-10-27 NOTE — HPI: CHANGING MOLE
How Severe Is Your Changing Mole?: mild
Has Your Changing Mole Been Treated?: has not been treated
Additional History: Started seeing his large moles slowly begin to disappear. Has no family history of melanoma.

## 2022-11-02 ENCOUNTER — APPOINTMENT (RX ONLY)
Dept: URBAN - METROPOLITAN AREA CLINIC 6 | Facility: CLINIC | Age: 52
Setting detail: DERMATOLOGY
End: 2022-11-02

## 2022-11-02 DIAGNOSIS — D18.0 HEMANGIOMA: ICD-10-CM

## 2022-11-02 DIAGNOSIS — D22 MELANOCYTIC NEVI: ICD-10-CM

## 2022-11-02 DIAGNOSIS — L81.4 OTHER MELANIN HYPERPIGMENTATION: ICD-10-CM

## 2022-11-02 DIAGNOSIS — Z71.89 OTHER SPECIFIED COUNSELING: ICD-10-CM

## 2022-11-02 PROBLEM — D22.71 MELANOCYTIC NEVI OF RIGHT LOWER LIMB, INCLUDING HIP: Status: ACTIVE | Noted: 2022-11-02

## 2022-11-02 PROBLEM — D48.5 NEOPLASM OF UNCERTAIN BEHAVIOR OF SKIN: Status: ACTIVE | Noted: 2022-11-02

## 2022-11-02 PROBLEM — D22.62 MELANOCYTIC NEVI OF LEFT UPPER LIMB, INCLUDING SHOULDER: Status: ACTIVE | Noted: 2022-11-02

## 2022-11-02 PROBLEM — D22.72 MELANOCYTIC NEVI OF LEFT LOWER LIMB, INCLUDING HIP: Status: ACTIVE | Noted: 2022-11-02

## 2022-11-02 PROBLEM — D22.5 MELANOCYTIC NEVI OF TRUNK: Status: ACTIVE | Noted: 2022-11-02

## 2022-11-02 PROBLEM — D22.61 MELANOCYTIC NEVI OF RIGHT UPPER LIMB, INCLUDING SHOULDER: Status: ACTIVE | Noted: 2022-11-02

## 2022-11-02 PROBLEM — D18.01 HEMANGIOMA OF SKIN AND SUBCUTANEOUS TISSUE: Status: ACTIVE | Noted: 2022-11-02

## 2022-11-02 PROCEDURE — 99213 OFFICE O/P EST LOW 20 MIN: CPT | Mod: 25

## 2022-11-02 PROCEDURE — ? COUNSELING

## 2022-11-02 PROCEDURE — ? ADDITIONAL NOTES

## 2022-11-02 PROCEDURE — ? BIOPSY BY SHAVE METHOD

## 2022-11-02 PROCEDURE — 11102 TANGNTL BX SKIN SINGLE LES: CPT

## 2022-11-02 ASSESSMENT — LOCATION ZONE DERM
LOCATION ZONE: ARM
LOCATION ZONE: TRUNK
LOCATION ZONE: FACE
LOCATION ZONE: LEG

## 2022-11-02 ASSESSMENT — LOCATION DETAILED DESCRIPTION DERM
LOCATION DETAILED: RIGHT PROXIMAL DORSAL FOREARM
LOCATION DETAILED: RIGHT MID-UPPER BACK
LOCATION DETAILED: RIGHT DISTAL POSTERIOR THIGH
LOCATION DETAILED: LEFT MID-UPPER BACK
LOCATION DETAILED: RIGHT MEDIAL INFERIOR CHEST
LOCATION DETAILED: RIGHT SUPERIOR LATERAL MIDBACK
LOCATION DETAILED: LEFT CENTRAL MALAR CHEEK
LOCATION DETAILED: RIGHT PROXIMAL CALF
LOCATION DETAILED: RIGHT VENTRAL DISTAL FOREARM
LOCATION DETAILED: LEFT PROXIMAL DORSAL FOREARM
LOCATION DETAILED: LEFT PROXIMAL CALF
LOCATION DETAILED: RIGHT VENTRAL PROXIMAL FOREARM
LOCATION DETAILED: LEFT VENTRAL DISTAL FOREARM
LOCATION DETAILED: RIGHT LATERAL SHOULDER
LOCATION DETAILED: LEFT ANTERIOR DISTAL UPPER ARM
LOCATION DETAILED: LEFT DISTAL POSTERIOR THIGH

## 2022-11-02 ASSESSMENT — LOCATION SIMPLE DESCRIPTION DERM
LOCATION SIMPLE: RIGHT POSTERIOR THIGH
LOCATION SIMPLE: RIGHT FOREARM
LOCATION SIMPLE: RIGHT CALF
LOCATION SIMPLE: RIGHT LOWER BACK
LOCATION SIMPLE: LEFT FOREARM
LOCATION SIMPLE: LEFT CALF
LOCATION SIMPLE: RIGHT SHOULDER
LOCATION SIMPLE: LEFT UPPER ARM
LOCATION SIMPLE: LEFT UPPER BACK
LOCATION SIMPLE: LEFT CHEEK
LOCATION SIMPLE: RIGHT UPPER BACK
LOCATION SIMPLE: LEFT POSTERIOR THIGH
LOCATION SIMPLE: CHEST

## 2022-11-02 NOTE — PROCEDURE: ADDITIONAL NOTES
Detail Level: Simple
Additional Notes: Biopsy proven 10/2021 Accession #[id number] —\\n\\nPt with multiple adult onset halo nevi. first noticed around spring 2021.\\nPt had negative eye exam\\Heather \"Association between halo nevi and melanoma in adults: A multicenter retrospective case series\" [email or web address] found that \"In conclusion, this study found that adult-onset halo nevi were associated with a 1% risk of primary cutaneous melanoma development in the year after halo nevus diagnosis, with no cases of primary noncutaneous or metastatic melanoma. This melanoma risk is comparable to that of individuals with a history of atypical nevi or personal or family history of melanoma.5 Given these findings, we recommend annual total body skin examinations in adults with a new diagnosis of halo nevus and without additional risk factors, and we do not advocate reflexive screening for primary noncutaneous or metastatic melanoma\".
Render Risk Assessment In Note?: no

## 2022-11-02 NOTE — PROCEDURE: BIOPSY BY SHAVE METHOD
Detail Level: Detailed
Depth Of Biopsy: dermis
Was A Bandage Applied: Yes
Size Of Lesion In Cm: 0.5
X Size Of Lesion In Cm: 0
Biopsy Type: H and E
Biopsy Method: Dermablade
Anesthesia Type: 1% lidocaine with epinephrine
Hemostasis: Electrocautery
Wound Care: Petrolatum
Dressing: bandage
Destruction After The Procedure: No
Type Of Destruction Used: Electrodesiccation and Curettage
Curettage Text: The wound bed was treated with curettage after the biopsy was performed.
Cryotherapy Text: The wound bed was treated with cryotherapy after the biopsy was performed.
Electrodesiccation Text: The wound bed was treated with electrodesiccation after the biopsy was performed.
Electrodesiccation And Curettage Text: The wound bed was treated with electrodesiccation and curettage after the biopsy was performed. Treated x 3
Silver Nitrate Text: The wound bed was treated with silver nitrate after the biopsy was performed.
Lab: [id number]
Lab Facility: 
Consent: Written consent was obtained and risks were reviewed including but not limited to scarring, infection, bleeding, scabbing, incomplete removal, nerve damage and allergy to anesthesia.
Post-Care Instructions: I reviewed with the patient in detail post-care instructions. Patient is to keep the biopsy site dry overnight, and then apply bacitracin twice daily until healed. Patient may apply hydrogen peroxide soaks to remove any crusting.
Notification Instructions: Patient will be notified of biopsy results. However, patient instructed to call the office if not contacted within 2 weeks.
Billing Type: Third-Party Bill
Information: Selecting Yes will display possible errors in your note based on the variables you have selected. This validation is only offered as a suggestion for you. PLEASE NOTE THAT THE VALIDATION TEXT WILL BE REMOVED WHEN YOU FINALIZE YOUR NOTE. IF YOU WANT TO FAX A PRELIMINARY NOTE YOU WILL NEED TO TOGGLE THIS TO 'NO' IF YOU DO NOT WANT IT IN YOUR FAXED NOTE.

## 2022-11-02 NOTE — PROCEDURE: COUNSELING
Detail Level: Zone
Detail Level: Generalized
The patient is a 83y Male complaining of difficulty breathing.

## 2023-05-01 ENCOUNTER — RESEARCH ENCOUNTER (OUTPATIENT)
Dept: RESEARCH | Facility: WORKSITE | Age: 53
End: 2023-05-01
Payer: OTHER GOVERNMENT

## 2023-05-01 DIAGNOSIS — Z00.6 RESEARCH STUDY PATIENT: ICD-10-CM

## 2023-05-03 ENCOUNTER — APPOINTMENT (RX ONLY)
Dept: URBAN - METROPOLITAN AREA CLINIC 6 | Facility: CLINIC | Age: 53
Setting detail: DERMATOLOGY
End: 2023-05-03

## 2023-05-03 DIAGNOSIS — Z71.89 OTHER SPECIFIED COUNSELING: ICD-10-CM

## 2023-05-03 DIAGNOSIS — D22 MELANOCYTIC NEVI: ICD-10-CM

## 2023-05-03 DIAGNOSIS — D18.0 HEMANGIOMA: ICD-10-CM

## 2023-05-03 DIAGNOSIS — L81.4 OTHER MELANIN HYPERPIGMENTATION: ICD-10-CM

## 2023-05-03 DIAGNOSIS — L30.8 OTHER SPECIFIED DERMATITIS: ICD-10-CM

## 2023-05-03 PROBLEM — D22.62 MELANOCYTIC NEVI OF LEFT UPPER LIMB, INCLUDING SHOULDER: Status: ACTIVE | Noted: 2023-05-03

## 2023-05-03 PROBLEM — D22.61 MELANOCYTIC NEVI OF RIGHT UPPER LIMB, INCLUDING SHOULDER: Status: ACTIVE | Noted: 2023-05-03

## 2023-05-03 PROBLEM — D22.72 MELANOCYTIC NEVI OF LEFT LOWER LIMB, INCLUDING HIP: Status: ACTIVE | Noted: 2023-05-03

## 2023-05-03 PROBLEM — D22.71 MELANOCYTIC NEVI OF RIGHT LOWER LIMB, INCLUDING HIP: Status: ACTIVE | Noted: 2023-05-03

## 2023-05-03 PROBLEM — D18.01 HEMANGIOMA OF SKIN AND SUBCUTANEOUS TISSUE: Status: ACTIVE | Noted: 2023-05-03

## 2023-05-03 PROBLEM — D22.5 MELANOCYTIC NEVI OF TRUNK: Status: ACTIVE | Noted: 2023-05-03

## 2023-05-03 PROCEDURE — ? MEDICAL PHOTOGRAPHY REVIEW

## 2023-05-03 PROCEDURE — 99213 OFFICE O/P EST LOW 20 MIN: CPT

## 2023-05-03 PROCEDURE — ? ADDITIONAL NOTES

## 2023-05-03 PROCEDURE — ? COUNSELING

## 2023-05-03 PROCEDURE — ? PRESCRIPTION

## 2023-05-03 PROCEDURE — ? PHOTO-DOCUMENTATION

## 2023-05-03 RX ORDER — CLOBETASOL PROPIONATE 0.5 MG/G
1 OINTMENT TOPICAL BID
Qty: 30 | Refills: 3 | Status: ERX | COMMUNITY
Start: 2023-05-03

## 2023-05-03 RX ADMIN — CLOBETASOL PROPIONATE 1: 0.5 OINTMENT TOPICAL at 00:00

## 2023-05-03 ASSESSMENT — LOCATION DETAILED DESCRIPTION DERM
LOCATION DETAILED: LEFT DISTAL POSTERIOR THIGH
LOCATION DETAILED: RIGHT SUPERIOR LATERAL MIDBACK
LOCATION DETAILED: LEFT MID-UPPER BACK
LOCATION DETAILED: RIGHT MID-UPPER BACK
LOCATION DETAILED: LEFT PROXIMAL CALF
LOCATION DETAILED: RIGHT VENTRAL PROXIMAL FOREARM
LOCATION DETAILED: LEFT THENAR EMINENCE
LOCATION DETAILED: RIGHT MEDIAL INFERIOR CHEST
LOCATION DETAILED: LEFT CENTRAL MALAR CHEEK
LOCATION DETAILED: RIGHT PROXIMAL DORSAL FOREARM
LOCATION DETAILED: LEFT PROXIMAL DORSAL FOREARM
LOCATION DETAILED: RIGHT DISTAL POSTERIOR THIGH
LOCATION DETAILED: RIGHT PROXIMAL CALF
LOCATION DETAILED: RIGHT VENTRAL DISTAL FOREARM
LOCATION DETAILED: LEFT ANTERIOR DISTAL UPPER ARM
LOCATION DETAILED: LEFT VENTRAL DISTAL FOREARM

## 2023-05-03 ASSESSMENT — LOCATION SIMPLE DESCRIPTION DERM
LOCATION SIMPLE: RIGHT CALF
LOCATION SIMPLE: RIGHT LOWER BACK
LOCATION SIMPLE: CHEST
LOCATION SIMPLE: LEFT UPPER ARM
LOCATION SIMPLE: LEFT POSTERIOR THIGH
LOCATION SIMPLE: LEFT UPPER BACK
LOCATION SIMPLE: RIGHT POSTERIOR THIGH
LOCATION SIMPLE: RIGHT UPPER BACK
LOCATION SIMPLE: LEFT HAND
LOCATION SIMPLE: RIGHT FOREARM
LOCATION SIMPLE: LEFT FOREARM
LOCATION SIMPLE: LEFT CHEEK
LOCATION SIMPLE: LEFT CALF

## 2023-05-03 ASSESSMENT — ITCH NUMERIC RATING SCALE: HOW SEVERE IS YOUR ITCHING?: 4

## 2023-05-03 ASSESSMENT — LOCATION ZONE DERM
LOCATION ZONE: FACE
LOCATION ZONE: TRUNK
LOCATION ZONE: HAND
LOCATION ZONE: LEG
LOCATION ZONE: ARM

## 2023-05-03 ASSESSMENT — SEVERITY ASSESSMENT: SEVERITY: MILD

## 2023-05-03 NOTE — PROCEDURE: ADDITIONAL NOTES
Detail Level: Simple
Additional Notes: No new lesions today\\nBiopsy proven 10/2021 Accession #[id number] \\n\\nPt with multiple adult onset halo nevi. first noticed around spring 2021.\\nPt had negative eye exam\\Heather \"Association between halo nevi and melanoma in adults: A multicenter retrospective case series\" [email or web address] found that \"In conclusion, this study found that adult-onset halo nevi were associated with a 1% risk of primary cutaneous melanoma development in the year after halo nevus diagnosis, with no cases of primary noncutaneous or metastatic melanoma. This melanoma risk is comparable to that of individuals with a history of atypical nevi or personal or family history of melanoma.5 Given these findings, we recommend annual total body skin examinations in adults with a new diagnosis of halo nevus and without additional risk factors, and we do not advocate reflexive screening for primary noncutaneous or metastatic melanoma\".
Render Risk Assessment In Note?: no

## 2023-05-30 LAB
APOB+LDLR+PCSK9 GENE MUT ANL BLD/T: NOT DETECTED
BRCA1+BRCA2 DEL+DUP + FULL MUT ANL BLD/T: NOT DETECTED
MLH1+MSH2+MSH6+PMS2 GN DEL+DUP+FUL M: NOT DETECTED

## 2023-05-31 NOTE — RESULT ENCOUNTER NOTE
We have reviewed your [employer] [place] [facility] results. They are NEGATIVE for variants associated with hereditary breast and ovarian cancer, bradshaw syndrome, and familial hypercholesterolemia. This means you are at average risk for these conditions. Please follow-up with your primary care provider or the [facility] [place] [facility] team at [phone] if you have any questions.

## (undated) DEVICE — DRAPE LOWER EXTREMETY - (6/CA)

## (undated) DEVICE — SUTURE GENERAL

## (undated) DEVICE — ELECTRODE 850 FOAM ADHESIVE - HYDROGEL RADIOTRNSPRNT (50/PK)

## (undated) DEVICE — GOWN WARMING STANDARD FLEX - (30/CA)

## (undated) DEVICE — PROTECTOR ULNA NERVE - (36PR/CA)

## (undated) DEVICE — GLOVE BIOGEL INDICATOR SZ 8 SURGICAL PF LTX - (50/BX 4BX/CA)

## (undated) DEVICE — GOWN SURGICAL X-LARGE ULTRA - FILM-REINFORCED (20/CA)

## (undated) DEVICE — CHLORAPREP 26 ML APPLICATOR - ORANGE TINT(25/CA)

## (undated) DEVICE — DRAPE LARGE 3 QUARTER - (20/CA)

## (undated) DEVICE — KIT ROOM DECONTAMINATION

## (undated) DEVICE — MASK, LARYNGEAL AIRWAY #4

## (undated) DEVICE — NEEDLE SAFETY 18 GA X 1 1/2 IN (100EA/BX)

## (undated) DEVICE — DRAPE SURGICAL U 77X120 - (10/CA)

## (undated) DEVICE — SUTURE 3-0 PROLENE PS-1 (12PK/BX)

## (undated) DEVICE — SODIUM CHL. IRRIGATION 0.9% 3000ML (4EA/CA 65CA/PF)

## (undated) DEVICE — KIT ANESTHESIA W/CIRCUIT & 3/LT BAG W/FILTER (20EA/CA)

## (undated) DEVICE — TUBING PATIENT W/CONNECTOR REDEUCE (1EA)

## (undated) DEVICE — GLOVE, LITE (PAIR)

## (undated) DEVICE — HEAD HOLDER JUNIOR/ADULT

## (undated) DEVICE — SPONGE GAUZESTER 4 X 4 4PLY - (128PK/CA)

## (undated) DEVICE — GLOVE BIOGEL INDICATOR SZ 7SURGICAL PF LTX - (50/BX 4BX/CA)

## (undated) DEVICE — GLOVE BIOGEL SZ 8 SURGICAL PF LTX - (50PR/BX 4BX/CA)

## (undated) DEVICE — GLOVE BIOGEL SZ 7 SURGICAL PF LTX - (50PR/BX 4BX/CA)

## (undated) DEVICE — SENSOR SPO2 NEO LNCS ADHESIVE (20/BX) SEE USER NOTES

## (undated) DEVICE — MASK ANESTHESIA ADULT  - (100/CA)

## (undated) DEVICE — NEPTUNE 4 PORT MANIFOLD - (20/PK)

## (undated) DEVICE — BLADE SHAVER AGGRESSIVE PLUS 4.0MM ANGLED (5EA/BX)

## (undated) DEVICE — SYRINGE DISP. 60 CC LL - (30/BX, 12BX/CA)**WHEN THESE ARE GONE ORDER #500206**